# Patient Record
Sex: MALE | Race: OTHER | HISPANIC OR LATINO | URBAN - METROPOLITAN AREA
[De-identification: names, ages, dates, MRNs, and addresses within clinical notes are randomized per-mention and may not be internally consistent; named-entity substitution may affect disease eponyms.]

---

## 2019-12-31 ENCOUNTER — EMERGENCY (EMERGENCY)
Facility: HOSPITAL | Age: 59
LOS: 1 days | Discharge: HOME | End: 2019-12-31
Attending: EMERGENCY MEDICINE | Admitting: EMERGENCY MEDICINE
Payer: COMMERCIAL

## 2019-12-31 VITALS
WEIGHT: 145.06 LBS | HEART RATE: 91 BPM | DIASTOLIC BLOOD PRESSURE: 116 MMHG | RESPIRATION RATE: 16 BRPM | OXYGEN SATURATION: 99 % | TEMPERATURE: 98 F | SYSTOLIC BLOOD PRESSURE: 195 MMHG

## 2019-12-31 VITALS
HEART RATE: 87 BPM | TEMPERATURE: 98 F | DIASTOLIC BLOOD PRESSURE: 83 MMHG | RESPIRATION RATE: 18 BRPM | OXYGEN SATURATION: 97 % | SYSTOLIC BLOOD PRESSURE: 152 MMHG

## 2019-12-31 PROCEDURE — 85610 PROTHROMBIN TIME: CPT

## 2019-12-31 PROCEDURE — 99283 EMERGENCY DEPT VISIT LOW MDM: CPT | Mod: 25

## 2019-12-31 PROCEDURE — 80053 COMPREHEN METABOLIC PANEL: CPT

## 2019-12-31 PROCEDURE — 85730 THROMBOPLASTIN TIME PARTIAL: CPT

## 2019-12-31 PROCEDURE — 93005 ELECTROCARDIOGRAM TRACING: CPT

## 2019-12-31 PROCEDURE — 85025 COMPLETE CBC W/AUTO DIFF WBC: CPT

## 2019-12-31 PROCEDURE — 36415 COLL VENOUS BLD VENIPUNCTURE: CPT

## 2019-12-31 PROCEDURE — 93010 ELECTROCARDIOGRAM REPORT: CPT

## 2019-12-31 PROCEDURE — 99284 EMERGENCY DEPT VISIT MOD MDM: CPT

## 2019-12-31 RX ORDER — AMLODIPINE BESYLATE 2.5 MG/1
1 TABLET ORAL
Qty: 30 | Refills: 0
Start: 2019-12-31 | End: 2020-01-29

## 2019-12-31 RX ORDER — AMLODIPINE BESYLATE 2.5 MG/1
5 TABLET ORAL ONCE
Refills: 0 | Status: COMPLETED | OUTPATIENT
Start: 2019-12-31 | End: 2019-12-31

## 2019-12-31 RX ADMIN — AMLODIPINE BESYLATE 5 MILLIGRAM(S): 2.5 TABLET ORAL at 17:14

## 2019-12-31 NOTE — ED PROVIDER NOTE - CLINICAL SUMMARY MEDICAL DECISION MAKING FREE TEXT BOX
ekg w lvh- nonischemic- labs neg, feeling better as bp lowering- will start norvasc 5 fu w pcp in 1 week

## 2019-12-31 NOTE — ED ADULT NURSE NOTE - PMH
Asthma  Asthma  Asymptomatic human immunodeficiency virus infection  HIV (human immunodeficiency virus infection)

## 2019-12-31 NOTE — ED PROVIDER NOTE - NSFOLLOWUPINSTRUCTIONS_ED_ALL_ED_FT
Hypertension, Adult  High blood pressure (hypertension) is when the force of blood pumping through the arteries is too strong. The arteries are the blood vessels that carry blood from the heart throughout the body. Hypertension forces the heart to work harder to pump blood and may cause arteries to become narrow or stiff. Untreated or uncontrolled hypertension can cause a heart attack, heart failure, a stroke, kidney disease, and other problems.  A blood pressure reading consists of a higher number over a lower number. Ideally, your blood pressure should be below 120/80. The first ("top") number is called the systolic pressure. It is a measure of the pressure in your arteries as your heart beats. The second ("bottom") number is called the diastolic pressure. It is a measure of the pressure in your arteries as the heart relaxes.  What are the causes?  The exact cause of this condition is not known. There are some conditions that result in or are related to high blood pressure.  What increases the risk?  Some risk factors for high blood pressure are under your control. The following factors may make you more likely to develop this condition:  Smoking.Having type 2 diabetes mellitus, high cholesterol, or both.Not getting enough exercise or physical activity.Being overweight.Having too much fat, sugar, calories, or salt (sodium) in your diet.Drinking too much alcohol.Some risk factors for high blood pressure may be difficult or impossible to change. Some of these factors include:  Having chronic kidney disease.Having a family history of high blood pressure.Age. Risk increases with age.Race. You may be at higher risk if you are .Gender. Men are at higher risk than women before age 45. After age 65, women are at higher risk than men.Having obstructive sleep apnea.Stress.What are the signs or symptoms?  High blood pressure may not cause symptoms. Very high blood pressure (hypertensive crisis) may cause:  Headache.Anxiety.Shortness of breath.Nosebleed.Nausea and vomiting.Vision changes.Severe chest pain.Seizures.How is this diagnosed?  This condition is diagnosed by measuring your blood pressure while you are seated, with your arm resting on a flat surface, your legs uncrossed, and your feet flat on the floor. The cuff of the blood pressure monitor will be placed directly against the skin of your upper arm at the level of your heart. It should be measured at least twice using the same arm. Certain conditions can cause a difference in blood pressure between your right and left arms.  Certain factors can cause blood pressure readings to be lower or higher than normal for a short period of time:  When your blood pressure is higher when you are in a health care provider's office than when you are at home, this is called white coat hypertension. Most people with this condition do not need medicines.When your blood pressure is higher at home than when you are in a health care provider's office, this is called masked hypertension. Most people with this condition may need medicines to control blood pressure.If you have a high blood pressure reading during one visit or you have normal blood pressure with other risk factors, you may be asked to:  Return on a different day to have your blood pressure checked again.Monitor your blood pressure at home for 1 week or longer.If you are diagnosed with hypertension, you may have other blood or imaging tests to help your health care provider understand your overall risk for other conditions.  How is this treated?  This condition is treated by making healthy lifestyle changes, such as eating healthy foods, exercising more, and reducing your alcohol intake. Your health care provider may prescribe medicine if lifestyle changes are not enough to get your blood pressure under control, and if:  Your systolic blood pressure is above 130.Your diastolic blood pressure is above 80.Your personal target blood pressure may vary depending on your medical conditions, your age, and other factors.  Follow these instructions at home:  Eating and drinking        Eat a diet that is high in fiber and potassium, and low in sodium, added sugar, and fat. An example eating plan is called the DASH (Dietary Approaches to Stop Hypertension) diet. To eat this way:  Eat plenty of fresh fruits and vegetables. Try to fill one half of your plate at each meal with fruits and vegetables.Eat whole grains, such as whole-wheat pasta, brown rice, or whole-grain bread. Fill about one fourth of your plate with whole grains.Eat or drink low-fat dairy products, such as skim milk or low-fat yogurt.Avoid fatty cuts of meat, processed or cured meats, and poultry with skin. Fill about one fourth of your plate with lean proteins, such as fish, chicken without skin, beans, eggs, or tofu.Avoid pre-made and processed foods. These tend to be higher in sodium, added sugar, and fat.Reduce your daily sodium intake. Most people with hypertension should eat less than 1,500 mg of sodium a day.Do not drink alcohol if:  Your health care provider tells you not to drink.You are pregnant, may be pregnant, or are planning to become pregnant.If you drink alcohol:  Limit how much you use to:  0–1 drink a day for women.0–2 drinks a day for men.Be aware of how much alcohol is in your drink. In the [place] one drink equals one 12 oz bottle of beer (355 mL), one 5 oz glass of wine (148 mL), or one 1½ oz glass of hard liquor (44 mL).Lifestyle        Work with your health care provider to maintain a healthy body weight or to lose weight. Ask what an ideal weight is for you.Get at least 30 minutes of exercise most days of the week. Activities may include walking, swimming, or biking.Include exercise to strengthen your muscles (resistance exercise), such as Pilates or lifting weights, as part of your weekly exercise routine. Try to do these types of exercises for 30 minutes at least 3 days a week.Do not use any products that contain nicotine or tobacco, such as cigarettes, e-cigarettes, and chewing tobacco. If you need help quitting, ask your health care provider.Monitor your blood pressure at home as told by your health care provider.Keep all follow-up visits as told by your health care provider. This is important.Medicines     Take over-the-counter and prescription medicines only as told by your health care provider. Follow directions carefully. Blood pressure medicines must be taken as prescribed.Do not skip doses of blood pressure medicine. Doing this puts you at risk for problems and can make the medicine less effective.Ask your health care provider about side effects or reactions to medicines that you should watch for.Contact a health care provider if you:  Think you are having a reaction to a medicine you are taking.Have headaches that keep coming back (recurring).Feel dizzy.Have swelling in your ankles.Have trouble with your vision.Get help right away if you:  Develop a severe headache or confusion.Have unusual weakness or numbness.Feel faint.Have severe pain in your chest or abdomen.Vomit repeatedly.Have trouble breathing.Summary  Hypertension is when the force of blood pumping through your arteries is too strong. If this condition is not controlled, it may put you at risk for serious complications.Your personal target blood pressure may vary depending on your medical conditions, your age, and other factors. For most people, a normal blood pressure is less than 120/80.Hypertension is treated with lifestyle changes, medicines, or a combination of both. Lifestyle changes include losing weight, eating a healthy, low-sodium diet, exercising more, and limiting alcohol.This information is not intended to replace advice given to you by your health care provider. Make sure you discuss any questions you have with your health care provider.    Document Released: 12/18/2006 Document Revised: 08/28/2019 Document Reviewed: 08/28/2019  [employer] Interactive Patient Education © 2019 [employer]

## 2019-12-31 NOTE — ED PROVIDER NOTE - OBJECTIVE STATEMENT
58 y/o former smoker M with a PMHx of HIV (undetectable viral load. CD4 160-170), asthma, and HTN (not on medication) presents to the ED with c/o head pressure, ear pressure, chest pressure and eye pressure secondary to his high BP x 2 W. Pt states that every visit he has with his PCP     Pt denies erythromycin use, Hx HTN medication prescription, Hx heart echo. 58 y/o former smoker M with a PMHx of HIV (undetectable viral load. CD4 160-170), asthma, and HTN (not on medication) presents to the ED with c/o head pressure, ear pressure, chest pressure and eye pressure secondary to his high BP x 2 W. Pt states that every visit he has with his PCP Dr. Lelo Archuleta    Pt denies erythromycin use, Hx HTN medication prescription, Hx heart echo. 60 y/o former smoker M with a PMHx of HIV on Bactrim(undetectable viral load. CD4 160-170), asthma, and HTN (not on meds) presents to the ED with c/o head pressure, ear pressure, chest pressure and eye pressure secondary to his high BP x 2 W. Pt states that every visit he has with his PCP Dr. Lelo Archuleta his BP is never high enough to warrant prescription meds. Recently, pt has noticed these symptoms from his HTN and he also reports difficulty sleeping. His head pressure moves around his head after 30min, and goes from the back of his head to the top of his head. His BP ranges from 130s-190s.Pt denies erythromycin use, Hx HTN medication prescription, Hx heart echo. He drinks 2 cups of coffee daily in the morning. His HIV doctor is Dr. Matthews. His pharmacy is [facility] [phone] [place] [address] [phone]). 58 y/o former smoker M with a PMHx of HIV on Bactrim (undetectable viral load. CD4 160-170), asthma, and HTN (not on meds) presents to the ED with c/o head pressure, ear pressure, chest pressure and eye pressure secondary to his high BP x 2 W. Pt states that every visit he has with his PCP Dr. Lelo Archuleta his BP is never high enough to warrant prescription meds. Recently, pt has noticed these symptoms from his HTN and he also reports difficulty sleeping. His head pressure moves around his head after 30min, and goes from the back of his head to the top of his head. His BP ranges from 130s-190s.Pt denies erythromycin use, Hx HTN medication prescription, Hx heart echo. He drinks 2 cups of coffee daily in the morning. His HIV doctor is Dr. Matthews. His pharmacy is [facility] [phone] [place] [address] [phone]). 58 y/o former smoker M with a PMHx of HIV on Bactrim (undetectable viral load. CD4 160-170), asthma, and HTN (not on meds) presents to the ED with c/o head pressure, ear pressure, chest pressure and eye pressure secondary to his high BP x 2 W. Pt states that every visit he has with his PCP Dr. Lelo Archuleta his BP is never high enough to warrant prescription meds. Recently, pt has noticed these symptoms from his HTN and he also reports difficulty sleeping. His head pressure moves around his head after 30min, and goes from the back of his head to the top of his head. His BP ranges from 130s-190s.Pt denies erythromycin use, Hx HTN medication prescription, Hx heart echo. He drinks 2 cups of coffee daily in the morning. His HIV doctor is Dr. Matthews. His pharmacy is [email or web address] [phone] [place] [address] [phone]).  no cp no sob no back pain

## 2019-12-31 NOTE — ED PROVIDER NOTE - PATIENT PORTAL LINK FT
You can access the FollowMyHealth Patient Portal offered by [facility] by registering at the following website: [email or web address]. By joining [employer]’s FollowMyHealth portal, you will also be able to view your health information using other applications (apps) compatible with our system.

## 2019-12-31 NOTE — ED PROVIDER NOTE - PMH
Asthma  Asthma  Asymptomatic human immunodeficiency virus infection  HIV (human immunodeficiency virus infection) Asthma  Asthma  Asymptomatic human immunodeficiency virus infection  HIV (human immunodeficiency virus infection)  HTN (hypertension)

## 2019-12-31 NOTE — ED ADULT NURSE NOTE - OBJECTIVE STATEMENT
Pt came to ED complaining of headache since this morning with hypertension. Pt reports headaches and hypertension x2 weeks and has seen PCP for symptoms.  Pt is currently not on medication. Pt reports head pressure, ear and eye pressure when he is hypertensive. Pt denies any neuro defects, negative results on Zavalla Stroke scale. Positive PMS x4 extremities, no facial droop, no facial asymmetry, weakness, vision changes, D/N/V found on exam.  Pt denies SOB.  Pt speaking in complete, clear sentences.

## 2019-12-31 NOTE — ED ADULT NURSE NOTE - CHPI ED NUR SYMPTOMS NEG
no blurred vision/no loss of consciousness/no nausea/no change in level of consciousness/no weakness/no dizziness/no confusion/no numbness/no vomiting/no fever

## 2020-01-05 DIAGNOSIS — R51 HEADACHE: ICD-10-CM

## 2020-01-05 DIAGNOSIS — I10 ESSENTIAL (PRIMARY) HYPERTENSION: ICD-10-CM

## 2021-06-17 PROBLEM — I10 ESSENTIAL (PRIMARY) HYPERTENSION: Chronic | Status: ACTIVE | Noted: 2019-12-31

## 2021-07-09 ENCOUNTER — TRANSCRIPTION ENCOUNTER (OUTPATIENT)
Age: 61
End: 2021-07-09

## 2021-07-11 ENCOUNTER — NON-APPOINTMENT (OUTPATIENT)
Age: 61
End: 2021-07-11

## 2021-07-20 ENCOUNTER — APPOINTMENT (OUTPATIENT)
Dept: SURGERY | Facility: CLINIC | Age: 61
End: 2021-07-20
Payer: COMMERCIAL

## 2021-07-20 VITALS
TEMPERATURE: 97.2 F | WEIGHT: 144 LBS | HEART RATE: 101 BPM | OXYGEN SATURATION: 97 % | HEIGHT: 65 IN | SYSTOLIC BLOOD PRESSURE: 121 MMHG | BODY MASS INDEX: 23.99 KG/M2 | DIASTOLIC BLOOD PRESSURE: 78 MMHG

## 2021-07-20 DIAGNOSIS — K40.90 UNILATERAL INGUINAL HERNIA, W/OUT OBSTRUCTION OR GANGRENE, NOT SPECIFIED AS RECURRENT: ICD-10-CM

## 2021-07-20 DIAGNOSIS — K42.9 UMBILICAL HERNIA W/OUT OBSTRUCTION OR GANGRENE: ICD-10-CM

## 2021-07-20 DIAGNOSIS — J45.909 UNSPECIFIED ASTHMA, UNCOMPLICATED: ICD-10-CM

## 2021-07-20 PROCEDURE — 99203 OFFICE O/P NEW LOW 30 MIN: CPT

## 2021-07-20 NOTE — HISTORY OF PRESENT ILLNESS
[de-identified] : Pt is a 62 y/o M with pmhx of  asthma, hiv pos, htn who presents today feeling well here for evaluation of L inguinal and umbilical hernia. Pt states he has had this hernia for about 3 years but recently it has become more bothersome requiring reduction almost every hr. Denies any pain, tenderness, difficulty urinating or moving his bowels. States this is the first time he has had this problem. States he also recently noticed a small umbilical hernia which he would like repaired at the same time. Denies any cardiac issues. Pt pcp is . Pt following  for his hiv.\par \par

## 2021-07-20 NOTE — PLAN
[FreeTextEntry1] : cardiac and medical clearance\par lap repair of left inguinal hernia repair and repair of umbilical hernia. meeth

## 2021-07-20 NOTE — PHYSICAL EXAM
[Normal] : affect appropriate [de-identified] : 6cm reducilbe inguinal hernia, 1 cm umbilical hernia, reducible, no ttp

## 2021-10-05 ENCOUNTER — LABORATORY RESULT (OUTPATIENT)
Age: 61
End: 2021-10-05

## 2021-10-05 ENCOUNTER — OUTPATIENT (OUTPATIENT)
Dept: OUTPATIENT SERVICES | Facility: HOSPITAL | Age: 61
LOS: 1 days | End: 2021-10-05
Payer: COMMERCIAL

## 2021-10-05 ENCOUNTER — RESULT REVIEW (OUTPATIENT)
Age: 61
End: 2021-10-05

## 2021-10-05 DIAGNOSIS — Z01.818 ENCOUNTER FOR OTHER PREPROCEDURAL EXAMINATION: ICD-10-CM

## 2021-10-05 LAB
A1C WITH ESTIMATED AVERAGE GLUCOSE RESULT: 5.5 % — SIGNIFICANT CHANGE UP (ref 4–5.6)
ALBUMIN SERPL ELPH-MCNC: 4.6 G/DL — SIGNIFICANT CHANGE UP (ref 3.3–5)
ALP SERPL-CCNC: 53 U/L — SIGNIFICANT CHANGE UP (ref 40–120)
ALT FLD-CCNC: 27 U/L — SIGNIFICANT CHANGE UP (ref 10–45)
ANION GAP SERPL CALC-SCNC: 8 MMOL/L — SIGNIFICANT CHANGE UP (ref 5–17)
APPEARANCE UR: CLEAR — SIGNIFICANT CHANGE UP
APTT BLD: 33.8 SEC — SIGNIFICANT CHANGE UP (ref 27.5–35.5)
AST SERPL-CCNC: 22 U/L — SIGNIFICANT CHANGE UP (ref 10–40)
BASOPHILS # BLD AUTO: 0.03 K/UL — SIGNIFICANT CHANGE UP (ref 0–0.2)
BASOPHILS NFR BLD AUTO: 0.8 % — SIGNIFICANT CHANGE UP (ref 0–2)
BILIRUB SERPL-MCNC: 1 MG/DL — SIGNIFICANT CHANGE UP (ref 0.2–1.2)
BILIRUB UR-MCNC: NEGATIVE — SIGNIFICANT CHANGE UP
BLD GP AB SCN SERPL QL: NEGATIVE — SIGNIFICANT CHANGE UP
BLD GP AB SCN SERPL QL: NEGATIVE — SIGNIFICANT CHANGE UP
BUN SERPL-MCNC: 17 MG/DL — SIGNIFICANT CHANGE UP (ref 7–23)
CALCIUM SERPL-MCNC: 10 MG/DL — SIGNIFICANT CHANGE UP (ref 8.4–10.5)
CHLORIDE SERPL-SCNC: 104 MMOL/L — SIGNIFICANT CHANGE UP (ref 96–108)
CO2 SERPL-SCNC: 30 MMOL/L — SIGNIFICANT CHANGE UP (ref 22–31)
COLOR SPEC: YELLOW — SIGNIFICANT CHANGE UP
CREAT SERPL-MCNC: 0.9 MG/DL — SIGNIFICANT CHANGE UP (ref 0.5–1.3)
DIFF PNL FLD: NEGATIVE — SIGNIFICANT CHANGE UP
EOSINOPHIL # BLD AUTO: 0.09 K/UL — SIGNIFICANT CHANGE UP (ref 0–0.5)
EOSINOPHIL NFR BLD AUTO: 2.5 % — SIGNIFICANT CHANGE UP (ref 0–6)
ESTIMATED AVERAGE GLUCOSE: 111 MG/DL — SIGNIFICANT CHANGE UP (ref 68–114)
GLUCOSE SERPL-MCNC: 105 MG/DL — HIGH (ref 70–99)
GLUCOSE UR QL: NEGATIVE — SIGNIFICANT CHANGE UP
HCT VFR BLD CALC: 49.1 % — SIGNIFICANT CHANGE UP (ref 39–50)
HGB BLD-MCNC: 15.5 G/DL — SIGNIFICANT CHANGE UP (ref 13–17)
IMM GRANULOCYTES NFR BLD AUTO: 0.8 % — SIGNIFICANT CHANGE UP (ref 0–1.5)
INR BLD: 1.05 — SIGNIFICANT CHANGE UP (ref 0.88–1.16)
KETONES UR-MCNC: NEGATIVE — SIGNIFICANT CHANGE UP
LEUKOCYTE ESTERASE UR-ACNC: NEGATIVE — SIGNIFICANT CHANGE UP
LYMPHOCYTES # BLD AUTO: 0.77 K/UL — LOW (ref 1–3.3)
LYMPHOCYTES # BLD AUTO: 21.8 % — SIGNIFICANT CHANGE UP (ref 13–44)
MCHC RBC-ENTMCNC: 27.2 PG — SIGNIFICANT CHANGE UP (ref 27–34)
MCHC RBC-ENTMCNC: 31.6 GM/DL — LOW (ref 32–36)
MCV RBC AUTO: 86.3 FL — SIGNIFICANT CHANGE UP (ref 80–100)
MONOCYTES # BLD AUTO: 0.39 K/UL — SIGNIFICANT CHANGE UP (ref 0–0.9)
MONOCYTES NFR BLD AUTO: 11 % — SIGNIFICANT CHANGE UP (ref 2–14)
NEUTROPHILS # BLD AUTO: 2.22 K/UL — SIGNIFICANT CHANGE UP (ref 1.8–7.4)
NEUTROPHILS NFR BLD AUTO: 63.1 % — SIGNIFICANT CHANGE UP (ref 43–77)
NITRITE UR-MCNC: NEGATIVE — SIGNIFICANT CHANGE UP
NRBC # BLD: 0 /100 WBCS — SIGNIFICANT CHANGE UP (ref 0–0)
PH UR: 6 — SIGNIFICANT CHANGE UP (ref 5–8)
PLATELET # BLD AUTO: 188 K/UL — SIGNIFICANT CHANGE UP (ref 150–400)
POTASSIUM SERPL-MCNC: 5 MMOL/L — SIGNIFICANT CHANGE UP (ref 3.5–5.3)
POTASSIUM SERPL-SCNC: 5 MMOL/L — SIGNIFICANT CHANGE UP (ref 3.5–5.3)
PROT SERPL-MCNC: 7.2 G/DL — SIGNIFICANT CHANGE UP (ref 6–8.3)
PROT UR-MCNC: NEGATIVE MG/DL — SIGNIFICANT CHANGE UP
PROTHROM AB SERPL-ACNC: 12.6 SEC — SIGNIFICANT CHANGE UP (ref 10.6–13.6)
RBC # BLD: 5.69 M/UL — SIGNIFICANT CHANGE UP (ref 4.2–5.8)
RBC # FLD: 13.3 % — SIGNIFICANT CHANGE UP (ref 10.3–14.5)
RH IG SCN BLD-IMP: POSITIVE — SIGNIFICANT CHANGE UP
RH IG SCN BLD-IMP: POSITIVE — SIGNIFICANT CHANGE UP
SODIUM SERPL-SCNC: 142 MMOL/L — SIGNIFICANT CHANGE UP (ref 135–145)
SP GR SPEC: 1.01 — SIGNIFICANT CHANGE UP (ref 1–1.03)
T4 FREE+ TSH PNL SERPL: 1.36 UIU/ML — SIGNIFICANT CHANGE UP (ref 0.27–4.2)
UROBILINOGEN FLD QL: 0.2 E.U./DL — SIGNIFICANT CHANGE UP
WBC # BLD: 3.53 K/UL — LOW (ref 3.8–10.5)
WBC # FLD AUTO: 3.53 K/UL — LOW (ref 3.8–10.5)

## 2021-10-05 PROCEDURE — 81003 URINALYSIS AUTO W/O SCOPE: CPT

## 2021-10-05 PROCEDURE — 80053 COMPREHEN METABOLIC PANEL: CPT

## 2021-10-05 PROCEDURE — 86901 BLOOD TYPING SEROLOGIC RH(D): CPT

## 2021-10-05 PROCEDURE — 84443 ASSAY THYROID STIM HORMONE: CPT

## 2021-10-05 PROCEDURE — 86900 BLOOD TYPING SEROLOGIC ABO: CPT

## 2021-10-05 PROCEDURE — 87086 URINE CULTURE/COLONY COUNT: CPT

## 2021-10-05 PROCEDURE — 86850 RBC ANTIBODY SCREEN: CPT

## 2021-10-05 PROCEDURE — 85610 PROTHROMBIN TIME: CPT

## 2021-10-05 PROCEDURE — 71046 X-RAY EXAM CHEST 2 VIEWS: CPT

## 2021-10-05 PROCEDURE — 93010 ELECTROCARDIOGRAM REPORT: CPT

## 2021-10-05 PROCEDURE — 85730 THROMBOPLASTIN TIME PARTIAL: CPT

## 2021-10-05 PROCEDURE — 99214 OFFICE O/P EST MOD 30 MIN: CPT

## 2021-10-05 PROCEDURE — 83036 HEMOGLOBIN GLYCOSYLATED A1C: CPT

## 2021-10-05 PROCEDURE — 93005 ELECTROCARDIOGRAM TRACING: CPT

## 2021-10-05 PROCEDURE — 71046 X-RAY EXAM CHEST 2 VIEWS: CPT | Mod: 26

## 2021-10-05 PROCEDURE — 85025 COMPLETE CBC W/AUTO DIFF WBC: CPT

## 2021-10-06 LAB
CULTURE RESULTS: NO GROWTH — SIGNIFICANT CHANGE UP
SPECIMEN SOURCE: SIGNIFICANT CHANGE UP

## 2021-11-08 ENCOUNTER — LABORATORY RESULT (OUTPATIENT)
Age: 61
End: 2021-11-08

## 2021-11-10 ENCOUNTER — APPOINTMENT (OUTPATIENT)
Dept: SURGERY | Facility: CLINIC | Age: 61
End: 2021-11-10
Payer: COMMERCIAL

## 2021-11-10 ENCOUNTER — OUTPATIENT (OUTPATIENT)
Dept: OUTPATIENT SERVICES | Facility: HOSPITAL | Age: 61
LOS: 1 days | Discharge: HOME | End: 2021-11-10

## 2021-11-10 PROCEDURE — 49652: CPT | Mod: GC

## 2021-11-10 PROCEDURE — 49650 LAP ING HERNIA REPAIR INIT: CPT | Mod: GC,LT

## 2021-11-16 ENCOUNTER — APPOINTMENT (OUTPATIENT)
Dept: SURGERY | Facility: CLINIC | Age: 61
End: 2021-11-16
Payer: COMMERCIAL

## 2021-11-16 VITALS
TEMPERATURE: 97.2 F | BODY MASS INDEX: 23.99 KG/M2 | HEIGHT: 65 IN | HEART RATE: 90 BPM | SYSTOLIC BLOOD PRESSURE: 162 MMHG | OXYGEN SATURATION: 97 % | WEIGHT: 144 LBS | DIASTOLIC BLOOD PRESSURE: 86 MMHG

## 2021-11-16 PROCEDURE — 99024 POSTOP FOLLOW-UP VISIT: CPT

## 2021-11-16 NOTE — HISTORY OF PRESENT ILLNESS
[de-identified] : Pt is a 62 y/o M who presents today for post op s/p left inguinal hernia repair with mesh, umbilical hernia primary repair 11/10/21. He is doing well overall. Pt reports eating regularly. He reports regular voiding and BM. 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

## 2021-11-16 NOTE — PHYSICAL EXAM
[Normal] : PERRL, EOMI, no conjunctival infection, anicteric [de-identified] : +Incisions clean, well healing, no signs of infection +some edema

## 2021-11-16 NOTE — ASSESSMENT
[FreeTextEntry1] : Pt is a 60 y/o M who presents today for post op s/p left inguinal hernia repair with mesh, umbilical hernia primary repair 11/10/21. Doing well. Eating well. Regular BM and urination. Pt has some residual swelling that I informed him is normal and will resolve with time. Will provide pt with back to work note. Pt will follow up in 1 month.

## 2021-11-16 NOTE — END OF VISIT
[FreeTextEntry3] : All medical record entries made by the Scribe were at my, Dr. Zeng's, discretion and personally dictated by me on 11/16/2021. I have reviewed the chart and agree that the record accurately reflects my personal performance of the history, physical exam, assessment and plan. I have also personally directed, reviewed and agreed to the chart.

## 2021-12-14 ENCOUNTER — APPOINTMENT (OUTPATIENT)
Dept: BARIATRICS | Facility: CLINIC | Age: 61
End: 2021-12-14
Payer: COMMERCIAL

## 2021-12-14 VITALS
TEMPERATURE: 97.6 F | DIASTOLIC BLOOD PRESSURE: 76 MMHG | OXYGEN SATURATION: 97 % | BODY MASS INDEX: 24.49 KG/M2 | HEART RATE: 84 BPM | HEIGHT: 65 IN | SYSTOLIC BLOOD PRESSURE: 138 MMHG | WEIGHT: 147 LBS

## 2021-12-14 PROCEDURE — 99024 POSTOP FOLLOW-UP VISIT: CPT

## 2021-12-14 NOTE — END OF VISIT
[FreeTextEntry3] : All medical record entries made by the Scribe were at my, Dr. Zeng's, discretion and personally dictated by me on 12/14/2021. I have reviewed the chart and agree that the record accurately reflects my personal performance of the history, physical exam, assessment and plan. I have also personally directed, reviewed and agreed to the chart.

## 2021-12-14 NOTE — PHYSICAL EXAM
[Obese] : obese [Normal] : affect appropriate [de-identified] : normal respiration [de-identified] : Incisions clean, well healing, no signs of infection

## 2021-12-14 NOTE — ASSESSMENT
[FreeTextEntry1] : Pt is a 60 y/o M who presents today for post op s/p left inguinal hernia repair with mesh, umbilical hernia primary repair 11/10/21. Doing well. He endorses some pain to the surgical site which I assured him is normal and should resolve with time. Pt is okay to start exercising but I advised him to start gradually. I informed him he can schedule a colonoscopy any time. Pt will follow up PRN.

## 2021-12-14 NOTE — HISTORY OF PRESENT ILLNESS
[de-identified] : Pt is a 60 y/o M who presents today for post op s/p left inguinal hernia repair with mesh, umbilical hernia primary repair 11/10/21. He is doing well overall. He endorses some pain to the surgical area. States swelling has reduced. Pt wants to know if he can start to exercise. Wants to know when he can schedule a colonoscopy.

## 2022-10-31 NOTE — ADDENDUM
[FreeTextEntry1] : This note was written by Mary Booth on 11/16/2021 acting as scribe for Dr. Zeng  Gabapentin Pregnancy And Lactation Text: This medication is Pregnancy Category C and isn't considered safe during pregnancy. It is excreted in breast milk.

## 2023-06-26 ENCOUNTER — OUTPATIENT (OUTPATIENT)
Dept: OUTPATIENT SERVICES | Facility: HOSPITAL | Age: 63
LOS: 1 days | End: 2023-06-26
Payer: COMMERCIAL

## 2023-06-26 DIAGNOSIS — J45.20 MILD INTERMITTENT ASTHMA, UNCOMPLICATED: ICD-10-CM

## 2023-06-26 PROCEDURE — 94060 EVALUATION OF WHEEZING: CPT

## 2023-06-26 PROCEDURE — 94010 BREATHING CAPACITY TEST: CPT | Mod: 26

## 2023-06-26 PROCEDURE — 94618 PULMONARY STRESS TESTING: CPT

## 2023-06-26 PROCEDURE — 94618 PULMONARY STRESS TESTING: CPT | Mod: 26

## 2023-12-17 ENCOUNTER — INPATIENT (INPATIENT)
Facility: HOSPITAL | Age: 63
LOS: 0 days | Discharge: HOME | DRG: 644 | End: 2023-12-18
Attending: INTERNAL MEDICINE | Admitting: GENERAL ACUTE CARE HOSPITAL
Payer: COMMERCIAL

## 2023-12-17 VITALS
RESPIRATION RATE: 18 BRPM | OXYGEN SATURATION: 100 % | DIASTOLIC BLOOD PRESSURE: 78 MMHG | WEIGHT: 145.06 LBS | SYSTOLIC BLOOD PRESSURE: 154 MMHG | HEART RATE: 84 BPM | TEMPERATURE: 98 F

## 2023-12-17 DIAGNOSIS — B20 HUMAN IMMUNODEFICIENCY VIRUS [HIV] DISEASE: ICD-10-CM

## 2023-12-17 DIAGNOSIS — Z29.9 ENCOUNTER FOR PROPHYLACTIC MEASURES, UNSPECIFIED: ICD-10-CM

## 2023-12-17 DIAGNOSIS — E78.5 HYPERLIPIDEMIA, UNSPECIFIED: ICD-10-CM

## 2023-12-17 DIAGNOSIS — E87.1 HYPO-OSMOLALITY AND HYPONATREMIA: ICD-10-CM

## 2023-12-17 DIAGNOSIS — F41.0 PANIC DISORDER [EPISODIC PAROXYSMAL ANXIETY]: ICD-10-CM

## 2023-12-17 DIAGNOSIS — N32.81 OVERACTIVE BLADDER: ICD-10-CM

## 2023-12-17 DIAGNOSIS — I10 ESSENTIAL (PRIMARY) HYPERTENSION: ICD-10-CM

## 2023-12-17 DIAGNOSIS — F41.9 ANXIETY DISORDER, UNSPECIFIED: ICD-10-CM

## 2023-12-17 DIAGNOSIS — Z98.890 OTHER SPECIFIED POSTPROCEDURAL STATES: Chronic | ICD-10-CM

## 2023-12-17 DIAGNOSIS — J45.909 UNSPECIFIED ASTHMA, UNCOMPLICATED: ICD-10-CM

## 2023-12-17 LAB
A1C WITH ESTIMATED AVERAGE GLUCOSE RESULT: 5.5 % — SIGNIFICANT CHANGE UP (ref 4–5.6)
A1C WITH ESTIMATED AVERAGE GLUCOSE RESULT: 5.5 % — SIGNIFICANT CHANGE UP (ref 4–5.6)
ANION GAP SERPL CALC-SCNC: 12 MMOL/L — SIGNIFICANT CHANGE UP (ref 5–17)
ANION GAP SERPL CALC-SCNC: 12 MMOL/L — SIGNIFICANT CHANGE UP (ref 5–17)
ANION GAP SERPL CALC-SCNC: 8 MMOL/L — SIGNIFICANT CHANGE UP (ref 5–17)
APPEARANCE UR: CLEAR — SIGNIFICANT CHANGE UP
APPEARANCE UR: CLEAR — SIGNIFICANT CHANGE UP
BASOPHILS # BLD AUTO: 0.06 K/UL — SIGNIFICANT CHANGE UP (ref 0–0.2)
BASOPHILS # BLD AUTO: 0.06 K/UL — SIGNIFICANT CHANGE UP (ref 0–0.2)
BASOPHILS NFR BLD AUTO: 1.3 % — SIGNIFICANT CHANGE UP (ref 0–2)
BASOPHILS NFR BLD AUTO: 1.3 % — SIGNIFICANT CHANGE UP (ref 0–2)
BILIRUB UR-MCNC: NEGATIVE — SIGNIFICANT CHANGE UP
BILIRUB UR-MCNC: NEGATIVE — SIGNIFICANT CHANGE UP
BUN SERPL-MCNC: 10 MG/DL — SIGNIFICANT CHANGE UP (ref 7–23)
BUN SERPL-MCNC: 10 MG/DL — SIGNIFICANT CHANGE UP (ref 7–23)
BUN SERPL-MCNC: 13 MG/DL — SIGNIFICANT CHANGE UP (ref 7–23)
BUN SERPL-MCNC: 13 MG/DL — SIGNIFICANT CHANGE UP (ref 7–23)
BUN SERPL-MCNC: 20 MG/DL — SIGNIFICANT CHANGE UP (ref 7–23)
BUN SERPL-MCNC: 20 MG/DL — SIGNIFICANT CHANGE UP (ref 7–23)
CALCIUM SERPL-MCNC: 9.2 MG/DL — SIGNIFICANT CHANGE UP (ref 8.4–10.5)
CALCIUM SERPL-MCNC: 9.2 MG/DL — SIGNIFICANT CHANGE UP (ref 8.4–10.5)
CALCIUM SERPL-MCNC: 9.5 MG/DL — SIGNIFICANT CHANGE UP (ref 8.4–10.5)
CHLORIDE SERPL-SCNC: 85 MMOL/L — LOW (ref 96–108)
CHLORIDE SERPL-SCNC: 85 MMOL/L — LOW (ref 96–108)
CHLORIDE SERPL-SCNC: 87 MMOL/L — LOW (ref 96–108)
CHLORIDE SERPL-SCNC: 87 MMOL/L — LOW (ref 96–108)
CHLORIDE SERPL-SCNC: 88 MMOL/L — LOW (ref 96–108)
CHLORIDE SERPL-SCNC: 88 MMOL/L — LOW (ref 96–108)
CO2 SERPL-SCNC: 24 MMOL/L — SIGNIFICANT CHANGE UP (ref 22–31)
CO2 SERPL-SCNC: 24 MMOL/L — SIGNIFICANT CHANGE UP (ref 22–31)
CO2 SERPL-SCNC: 26 MMOL/L — SIGNIFICANT CHANGE UP (ref 22–31)
CO2 SERPL-SCNC: 26 MMOL/L — SIGNIFICANT CHANGE UP (ref 22–31)
CO2 SERPL-SCNC: 28 MMOL/L — SIGNIFICANT CHANGE UP (ref 22–31)
CO2 SERPL-SCNC: 28 MMOL/L — SIGNIFICANT CHANGE UP (ref 22–31)
COLOR SPEC: YELLOW — SIGNIFICANT CHANGE UP
COLOR SPEC: YELLOW — SIGNIFICANT CHANGE UP
CREAT SERPL-MCNC: 0.73 MG/DL — SIGNIFICANT CHANGE UP (ref 0.5–1.3)
CREAT SERPL-MCNC: 0.73 MG/DL — SIGNIFICANT CHANGE UP (ref 0.5–1.3)
CREAT SERPL-MCNC: 0.76 MG/DL — SIGNIFICANT CHANGE UP (ref 0.5–1.3)
CREAT SERPL-MCNC: 0.76 MG/DL — SIGNIFICANT CHANGE UP (ref 0.5–1.3)
CREAT SERPL-MCNC: 0.91 MG/DL — SIGNIFICANT CHANGE UP (ref 0.5–1.3)
CREAT SERPL-MCNC: 0.91 MG/DL — SIGNIFICANT CHANGE UP (ref 0.5–1.3)
CREAT SERPL-MCNC: 1.1 MG/DL — SIGNIFICANT CHANGE UP (ref 0.5–1.3)
CREAT SERPL-MCNC: 1.1 MG/DL — SIGNIFICANT CHANGE UP (ref 0.5–1.3)
DIFF PNL FLD: NEGATIVE — SIGNIFICANT CHANGE UP
DIFF PNL FLD: NEGATIVE — SIGNIFICANT CHANGE UP
EGFR: 101 ML/MIN/1.73M2 — SIGNIFICANT CHANGE UP
EGFR: 101 ML/MIN/1.73M2 — SIGNIFICANT CHANGE UP
EGFR: 102 ML/MIN/1.73M2 — SIGNIFICANT CHANGE UP
EGFR: 102 ML/MIN/1.73M2 — SIGNIFICANT CHANGE UP
EGFR: 75 ML/MIN/1.73M2 — SIGNIFICANT CHANGE UP
EGFR: 75 ML/MIN/1.73M2 — SIGNIFICANT CHANGE UP
EGFR: 95 ML/MIN/1.73M2 — SIGNIFICANT CHANGE UP
EGFR: 95 ML/MIN/1.73M2 — SIGNIFICANT CHANGE UP
EOSINOPHIL # BLD AUTO: 0.08 K/UL — SIGNIFICANT CHANGE UP (ref 0–0.5)
EOSINOPHIL # BLD AUTO: 0.08 K/UL — SIGNIFICANT CHANGE UP (ref 0–0.5)
EOSINOPHIL NFR BLD AUTO: 1.7 % — SIGNIFICANT CHANGE UP (ref 0–6)
EOSINOPHIL NFR BLD AUTO: 1.7 % — SIGNIFICANT CHANGE UP (ref 0–6)
ESTIMATED AVERAGE GLUCOSE: 111 MG/DL — SIGNIFICANT CHANGE UP (ref 68–114)
ESTIMATED AVERAGE GLUCOSE: 111 MG/DL — SIGNIFICANT CHANGE UP (ref 68–114)
GLUCOSE SERPL-MCNC: 103 MG/DL — HIGH (ref 70–99)
GLUCOSE SERPL-MCNC: 103 MG/DL — HIGH (ref 70–99)
GLUCOSE SERPL-MCNC: 116 MG/DL — HIGH (ref 70–99)
GLUCOSE SERPL-MCNC: 116 MG/DL — HIGH (ref 70–99)
GLUCOSE SERPL-MCNC: 93 MG/DL — SIGNIFICANT CHANGE UP (ref 70–99)
GLUCOSE SERPL-MCNC: 93 MG/DL — SIGNIFICANT CHANGE UP (ref 70–99)
GLUCOSE UR QL: NEGATIVE MG/DL — SIGNIFICANT CHANGE UP
GLUCOSE UR QL: NEGATIVE MG/DL — SIGNIFICANT CHANGE UP
HCT VFR BLD CALC: 45.6 % — SIGNIFICANT CHANGE UP (ref 39–50)
HCT VFR BLD CALC: 45.6 % — SIGNIFICANT CHANGE UP (ref 39–50)
HGB BLD-MCNC: 15.8 G/DL — SIGNIFICANT CHANGE UP (ref 13–17)
HGB BLD-MCNC: 15.8 G/DL — SIGNIFICANT CHANGE UP (ref 13–17)
IMM GRANULOCYTES NFR BLD AUTO: 0.2 % — SIGNIFICANT CHANGE UP (ref 0–0.9)
IMM GRANULOCYTES NFR BLD AUTO: 0.2 % — SIGNIFICANT CHANGE UP (ref 0–0.9)
KETONES UR-MCNC: 40 MG/DL
KETONES UR-MCNC: 40 MG/DL
LEUKOCYTE ESTERASE UR-ACNC: NEGATIVE — SIGNIFICANT CHANGE UP
LEUKOCYTE ESTERASE UR-ACNC: NEGATIVE — SIGNIFICANT CHANGE UP
LYMPHOCYTES # BLD AUTO: 0.82 K/UL — LOW (ref 1–3.3)
LYMPHOCYTES # BLD AUTO: 0.82 K/UL — LOW (ref 1–3.3)
LYMPHOCYTES # BLD AUTO: 17.1 % — SIGNIFICANT CHANGE UP (ref 13–44)
LYMPHOCYTES # BLD AUTO: 17.1 % — SIGNIFICANT CHANGE UP (ref 13–44)
MCHC RBC-ENTMCNC: 27.8 PG — SIGNIFICANT CHANGE UP (ref 27–34)
MCHC RBC-ENTMCNC: 27.8 PG — SIGNIFICANT CHANGE UP (ref 27–34)
MCHC RBC-ENTMCNC: 34.6 GM/DL — SIGNIFICANT CHANGE UP (ref 32–36)
MCHC RBC-ENTMCNC: 34.6 GM/DL — SIGNIFICANT CHANGE UP (ref 32–36)
MCV RBC AUTO: 80.1 FL — SIGNIFICANT CHANGE UP (ref 80–100)
MCV RBC AUTO: 80.1 FL — SIGNIFICANT CHANGE UP (ref 80–100)
MONOCYTES # BLD AUTO: 0.54 K/UL — SIGNIFICANT CHANGE UP (ref 0–0.9)
MONOCYTES # BLD AUTO: 0.54 K/UL — SIGNIFICANT CHANGE UP (ref 0–0.9)
MONOCYTES NFR BLD AUTO: 11.3 % — SIGNIFICANT CHANGE UP (ref 2–14)
MONOCYTES NFR BLD AUTO: 11.3 % — SIGNIFICANT CHANGE UP (ref 2–14)
NEUTROPHILS # BLD AUTO: 3.29 K/UL — SIGNIFICANT CHANGE UP (ref 1.8–7.4)
NEUTROPHILS # BLD AUTO: 3.29 K/UL — SIGNIFICANT CHANGE UP (ref 1.8–7.4)
NEUTROPHILS NFR BLD AUTO: 68.4 % — SIGNIFICANT CHANGE UP (ref 43–77)
NEUTROPHILS NFR BLD AUTO: 68.4 % — SIGNIFICANT CHANGE UP (ref 43–77)
NITRITE UR-MCNC: NEGATIVE — SIGNIFICANT CHANGE UP
NITRITE UR-MCNC: NEGATIVE — SIGNIFICANT CHANGE UP
NRBC # BLD: 0 /100 WBCS — SIGNIFICANT CHANGE UP (ref 0–0)
NRBC # BLD: 0 /100 WBCS — SIGNIFICANT CHANGE UP (ref 0–0)
OSMOLALITY UR: 481 MOSM/KG — SIGNIFICANT CHANGE UP (ref 300–900)
OSMOLALITY UR: 481 MOSM/KG — SIGNIFICANT CHANGE UP (ref 300–900)
PH UR: 6.5 — SIGNIFICANT CHANGE UP (ref 5–8)
PH UR: 6.5 — SIGNIFICANT CHANGE UP (ref 5–8)
PLATELET # BLD AUTO: 218 K/UL — SIGNIFICANT CHANGE UP (ref 150–400)
PLATELET # BLD AUTO: 218 K/UL — SIGNIFICANT CHANGE UP (ref 150–400)
POTASSIUM SERPL-MCNC: 3.8 MMOL/L — SIGNIFICANT CHANGE UP (ref 3.5–5.3)
POTASSIUM SERPL-MCNC: 3.8 MMOL/L — SIGNIFICANT CHANGE UP (ref 3.5–5.3)
POTASSIUM SERPL-MCNC: 4.1 MMOL/L — SIGNIFICANT CHANGE UP (ref 3.5–5.3)
POTASSIUM SERPL-MCNC: 4.1 MMOL/L — SIGNIFICANT CHANGE UP (ref 3.5–5.3)
POTASSIUM SERPL-MCNC: 4.2 MMOL/L — SIGNIFICANT CHANGE UP (ref 3.5–5.3)
POTASSIUM SERPL-MCNC: 4.2 MMOL/L — SIGNIFICANT CHANGE UP (ref 3.5–5.3)
POTASSIUM SERPL-SCNC: 3.8 MMOL/L — SIGNIFICANT CHANGE UP (ref 3.5–5.3)
POTASSIUM SERPL-SCNC: 3.8 MMOL/L — SIGNIFICANT CHANGE UP (ref 3.5–5.3)
POTASSIUM SERPL-SCNC: 4.1 MMOL/L — SIGNIFICANT CHANGE UP (ref 3.5–5.3)
POTASSIUM SERPL-SCNC: 4.1 MMOL/L — SIGNIFICANT CHANGE UP (ref 3.5–5.3)
POTASSIUM SERPL-SCNC: 4.2 MMOL/L — SIGNIFICANT CHANGE UP (ref 3.5–5.3)
POTASSIUM SERPL-SCNC: 4.2 MMOL/L — SIGNIFICANT CHANGE UP (ref 3.5–5.3)
PROT UR-MCNC: NEGATIVE MG/DL — SIGNIFICANT CHANGE UP
PROT UR-MCNC: NEGATIVE MG/DL — SIGNIFICANT CHANGE UP
RBC # BLD: 5.69 M/UL — SIGNIFICANT CHANGE UP (ref 4.2–5.8)
RBC # BLD: 5.69 M/UL — SIGNIFICANT CHANGE UP (ref 4.2–5.8)
RBC # FLD: 12.1 % — SIGNIFICANT CHANGE UP (ref 10.3–14.5)
RBC # FLD: 12.1 % — SIGNIFICANT CHANGE UP (ref 10.3–14.5)
SODIUM SERPL-SCNC: 121 MMOL/L — LOW (ref 135–145)
SODIUM SERPL-SCNC: 124 MMOL/L — LOW (ref 135–145)
SODIUM SERPL-SCNC: 124 MMOL/L — LOW (ref 135–145)
SODIUM UR-SCNC: 70 MMOL/L — SIGNIFICANT CHANGE UP
SODIUM UR-SCNC: 70 MMOL/L — SIGNIFICANT CHANGE UP
SP GR SPEC: 1.02 — SIGNIFICANT CHANGE UP (ref 1–1.03)
SP GR SPEC: 1.02 — SIGNIFICANT CHANGE UP (ref 1–1.03)
TROPONIN T, HIGH SENSITIVITY RESULT: 13 NG/L — SIGNIFICANT CHANGE UP (ref 0–51)
TROPONIN T, HIGH SENSITIVITY RESULT: 13 NG/L — SIGNIFICANT CHANGE UP (ref 0–51)
TSH SERPL-MCNC: 1.28 UIU/ML — SIGNIFICANT CHANGE UP (ref 0.27–4.2)
TSH SERPL-MCNC: 1.28 UIU/ML — SIGNIFICANT CHANGE UP (ref 0.27–4.2)
UROBILINOGEN FLD QL: 1 MG/DL — SIGNIFICANT CHANGE UP (ref 0.2–1)
UROBILINOGEN FLD QL: 1 MG/DL — SIGNIFICANT CHANGE UP (ref 0.2–1)
WBC # BLD: 4.8 K/UL — SIGNIFICANT CHANGE UP (ref 3.8–10.5)
WBC # BLD: 4.8 K/UL — SIGNIFICANT CHANGE UP (ref 3.8–10.5)
WBC # FLD AUTO: 4.8 K/UL — SIGNIFICANT CHANGE UP (ref 3.8–10.5)
WBC # FLD AUTO: 4.8 K/UL — SIGNIFICANT CHANGE UP (ref 3.8–10.5)

## 2023-12-17 PROCEDURE — 93010 ELECTROCARDIOGRAM REPORT: CPT

## 2023-12-17 PROCEDURE — 99285 EMERGENCY DEPT VISIT HI MDM: CPT

## 2023-12-17 PROCEDURE — 74018 RADEX ABDOMEN 1 VIEW: CPT | Mod: 26

## 2023-12-17 PROCEDURE — 71045 X-RAY EXAM CHEST 1 VIEW: CPT | Mod: 26

## 2023-12-17 PROCEDURE — 99222 1ST HOSP IP/OBS MODERATE 55: CPT | Mod: GC

## 2023-12-17 RX ORDER — RILPIVIRINE HYDROCHLORIDE 25 MG/1
25 TABLET, FILM COATED ORAL
Refills: 0 | Status: DISCONTINUED | OUTPATIENT
Start: 2023-12-17 | End: 2023-12-17

## 2023-12-17 RX ORDER — ONDANSETRON 8 MG/1
4 TABLET, FILM COATED ORAL EVERY 8 HOURS
Refills: 0 | Status: DISCONTINUED | OUTPATIENT
Start: 2023-12-17 | End: 2023-12-18

## 2023-12-17 RX ORDER — ENOXAPARIN SODIUM 100 MG/ML
40 INJECTION SUBCUTANEOUS EVERY 24 HOURS
Refills: 0 | Status: DISCONTINUED | OUTPATIENT
Start: 2023-12-17 | End: 2023-12-18

## 2023-12-17 RX ORDER — IPRATROPIUM/ALBUTEROL SULFATE 18-103MCG
3 AEROSOL WITH ADAPTER (GRAM) INHALATION EVERY 6 HOURS
Refills: 0 | Status: DISCONTINUED | OUTPATIENT
Start: 2023-12-17 | End: 2023-12-18

## 2023-12-17 RX ORDER — RILPIVIRINE HYDROCHLORIDE 25 MG/1
25 TABLET, FILM COATED ORAL DAILY
Refills: 0 | Status: DISCONTINUED | OUTPATIENT
Start: 2023-12-17 | End: 2023-12-18

## 2023-12-17 RX ORDER — SODIUM CHLORIDE 9 MG/ML
1 INJECTION INTRAMUSCULAR; INTRAVENOUS; SUBCUTANEOUS THREE TIMES A DAY
Refills: 0 | Status: DISCONTINUED | OUTPATIENT
Start: 2023-12-17 | End: 2023-12-18

## 2023-12-17 RX ORDER — ATORVASTATIN CALCIUM 80 MG/1
20 TABLET, FILM COATED ORAL AT BEDTIME
Refills: 0 | Status: DISCONTINUED | OUTPATIENT
Start: 2023-12-17 | End: 2023-12-18

## 2023-12-17 RX ORDER — SODIUM CHLORIDE 5 G/100ML
500 INJECTION, SOLUTION INTRAVENOUS
Refills: 0 | Status: DISCONTINUED | OUTPATIENT
Start: 2023-12-17 | End: 2023-12-17

## 2023-12-17 RX ORDER — LANOLIN ALCOHOL/MO/W.PET/CERES
3 CREAM (GRAM) TOPICAL AT BEDTIME
Refills: 0 | Status: DISCONTINUED | OUTPATIENT
Start: 2023-12-17 | End: 2023-12-18

## 2023-12-17 RX ORDER — AMLODIPINE BESYLATE 2.5 MG/1
5 TABLET ORAL DAILY
Refills: 0 | Status: DISCONTINUED | OUTPATIENT
Start: 2023-12-17 | End: 2023-12-18

## 2023-12-17 RX ORDER — ACETAMINOPHEN 500 MG
650 TABLET ORAL EVERY 6 HOURS
Refills: 0 | Status: DISCONTINUED | OUTPATIENT
Start: 2023-12-17 | End: 2023-12-18

## 2023-12-17 RX ORDER — TAMSULOSIN HYDROCHLORIDE 0.4 MG/1
0.4 CAPSULE ORAL AT BEDTIME
Refills: 0 | Status: DISCONTINUED | OUTPATIENT
Start: 2023-12-17 | End: 2023-12-18

## 2023-12-17 RX ORDER — DOLUTEGRAVIR SODIUM 25 MG/1
50 TABLET, FILM COATED ORAL DAILY
Refills: 0 | Status: DISCONTINUED | OUTPATIENT
Start: 2023-12-17 | End: 2023-12-17

## 2023-12-17 RX ORDER — DOLUTEGRAVIR SODIUM 25 MG/1
50 TABLET, FILM COATED ORAL DAILY
Refills: 0 | Status: DISCONTINUED | OUTPATIENT
Start: 2023-12-17 | End: 2023-12-18

## 2023-12-17 RX ORDER — SODIUM CHLORIDE 5 G/100ML
500 INJECTION, SOLUTION INTRAVENOUS
Refills: 0 | Status: COMPLETED | OUTPATIENT
Start: 2023-12-17 | End: 2023-12-18

## 2023-12-17 RX ORDER — ALPRAZOLAM 0.25 MG
0.5 TABLET ORAL ONCE
Refills: 0 | Status: DISCONTINUED | OUTPATIENT
Start: 2023-12-17 | End: 2023-12-17

## 2023-12-17 RX ADMIN — RILPIVIRINE HYDROCHLORIDE 25 MILLIGRAM(S): 25 TABLET, FILM COATED ORAL at 18:51

## 2023-12-17 RX ADMIN — SODIUM CHLORIDE 1 GRAM(S): 9 INJECTION INTRAMUSCULAR; INTRAVENOUS; SUBCUTANEOUS at 21:35

## 2023-12-17 RX ADMIN — AMLODIPINE BESYLATE 5 MILLIGRAM(S): 2.5 TABLET ORAL at 21:36

## 2023-12-17 RX ADMIN — Medication 0.5 MILLIGRAM(S): at 10:41

## 2023-12-17 RX ADMIN — ATORVASTATIN CALCIUM 20 MILLIGRAM(S): 80 TABLET, FILM COATED ORAL at 21:36

## 2023-12-17 RX ADMIN — DOLUTEGRAVIR SODIUM 50 MILLIGRAM(S): 25 TABLET, FILM COATED ORAL at 18:51

## 2023-12-17 RX ADMIN — TAMSULOSIN HYDROCHLORIDE 0.4 MILLIGRAM(S): 0.4 CAPSULE ORAL at 21:35

## 2023-12-17 RX ADMIN — SODIUM CHLORIDE 1 GRAM(S): 9 INJECTION INTRAMUSCULAR; INTRAVENOUS; SUBCUTANEOUS at 18:51

## 2023-12-17 NOTE — ED ADULT NURSE NOTE - PRO INTERPRETER NEED 2
Scheduled Via: Case Request Order for 84S Covid test 8/28 Donaldo  Procedure date: 8/30  Procedure time:  ;If facility doesn't give time, did patient request a specific time? No; If so, what time was requested?   Rep Contacted?: No     The following have been confirmed:  Insurance name confirmed as [facility], will be the same at time of procedure?: Yes  Insurance Accepted at Facility? Yes     Patient was scheduled with MA in the office  Prep required? Yes, patient was given letter in the office and prep was sent to preferred pharmacy.         English

## 2023-12-17 NOTE — H&P ADULT - PROBLEM SELECTOR PLAN 2
Patient reports urge to urinate frequently overnight causing lost sleep since September 2023. Denies pain in suprapubic area to palpation. No burning, straining/ hesitancy, no issues with bowel movements, fevers/chills.  Consider bladder spams vs BPH,  Denies hx of DM, thyroid disorder, trauma/injury  UA ketone 40    - f/u A1c, TSH  - I&Os to determine true UOP  - post void BS  - consider trial of oxybutynin if workup is otherwise negative Patient reports urge to urinate frequently overnight causing lost sleep since September 2023. Denies pain in suprapubic area to palpation. No burning, straining/ hesitancy, no issues with bowel movements, fevers/chills.  Consider bladder spams vs BPH,  Denies hx of DM, thyroid disorder, trauma/injury  UA ketone 40    - f/u A1c, TSH  - start flowmax  - I&Os to determine true UOP  - post void BS  - consider trial of oxybutynin if workup is otherwise negative Patient reports urge to urinate frequently overnight causing lost sleep since September 2023. Denies pain in suprapubic area to palpation. No burning, straining/ hesitancy, no issues with bowel movements, fevers/chills.  Consider bladder spams vs BPH,  Denies hx of DM, thyroid disorder, trauma/injury  UA ketone 40, A1c 5.5    - f/u TSH  - start flowmax  - I&Os to determine true UOP  - post void BS  - consider trial of oxybutynin if workup is otherwise negative

## 2023-12-17 NOTE — H&P ADULT - NSICDXPASTMEDICALHX_GEN_ALL_CORE_FT
PAST MEDICAL HISTORY:  Asthma Asthma    Asymptomatic human immunodeficiency virus infection HIV (human immunodeficiency virus infection)    HLD (hyperlipidemia)     HTN (hypertension)

## 2023-12-17 NOTE — ED ADULT NURSE NOTE - OBJECTIVE STATEMENT
Pt is a 62yo male PMHx anxiety, HIV, asthma presents to ED c/o chest discomfort. Pt states, "I have had increased trouble sleeping the past 3 months due to having to go to the bathroom every hour. I have also had worse chest pressure and palpitations over the last 3 months". Pt reports seeing PCP beginning of december, starting a new antianxiety medication. Reports today felt shaky which made him come to ER. Pt is A&Ox4, breathing equal and unlabored, denies dizziness, sob, f/c.

## 2023-12-17 NOTE — ED ADULT TRIAGE NOTE - CHIEF COMPLAINT QUOTE
" I have chest pressure and palpation since about 3 months. I have not been able to sleep since 1 month. I feel nervousness and I am shaky. I also keep waking up every night to pee every half hour." hx HIV, HLD, HTN and asthma.

## 2023-12-17 NOTE — ED PROVIDER NOTE - ATTENDING APP SHARED VISIT CONTRIBUTION OF CARE
63 M pmh anxiety, HIV cd4 180s VL undetect, htn, hld p/w chest pressure and palpitations x 3mons and frequent urination x 10 days.   pt reports diffuse chest pressure w/ associated palpitations and feeling very anxious, intermittent for a few mons.  no change w/ exertion.  deep  breaths help.  saw PMD and thought to be anxiety, started on prozac 12/6 and now w/ difficulty sleeping and increased urination (4+ episodes per night) after starting prozac.  denies f/c. HA, dizziness, fainting, uri sxs, abd pain, nvd, trauma    on exam pt anxious appearing but nad, lungs ctab, hrrr, abd soft/nt, no cvat.  r/o uti, cp likely anxiety but will eval for arrhythmia/acs.  will obtain labs, ekg, cxr, give xanax    Addendum to attending statement: I have reviewed the ACP note and agree with the history, exam, and plan of care. I  was available to PA   as a supervising provider if needed. PA given opportunity to ask questions and request further evaluation / care.    Pt well appearing - labs with hyponatremia most likely from SIADH secondary to starting prozac - pt symptomatic with urinary frequency.  To be treated with volume restriction and admission.

## 2023-12-17 NOTE — H&P ADULT - PROBLEM SELECTOR PLAN 1
Possibly 2/2 SIADH vs psychogenic polydipsia   Patient started on fluoxitine 20 mg 12/6 making SIADH due to SSRI more likely  Na 124, Serum osm 264    - f/u urine lytes  - fluid restrict to 1.5 L  - goal Na 132 by 9 AM 12/18  - recheck BMP q 6-8h  - hold fluoxitine for now, consider psych consult for lower dosing/med change  - Monitor for withdrawal symptoms- per [employer] fluoxitine has low risk for SSRI withdrawal Possibly 2/2 SIADH vs psychogenic polydipsia   Patient started on fluoxitine 20 mg 12/6 making SIADH due to SSRI more likely  Na 124, Serum osm 264    - f/u urine lytes  - fluid restrict to 1.5 L  - start salt tab 1 g TID  - goal Na 132 by 9 AM 12/18  - recheck BMP q 6-8h  - hold fluoxitine for now, consider psych consult for lower dosing/med change  - Monitor for withdrawal symptoms- per [employer] fluoxitine has low risk for SSRI withdrawal Possibly 2/2 SIADH vs psychogenic polydipsia   Patient started on fluoxitine 20 mg 12/6 making SIADH due to SSRI more likely  Na 124, Serum osm 264, Urine Na 70, Urine osm 481    - fluid restrict to 1.5 L  - start salt tab 1 g TID  - goal Na 132 by 9 AM 12/18  - recheck BMP q 6-8h  - hold fluoxitine for now, consider psych consult for lower dosing/med change  - Monitor for withdrawal symptoms- per [employer] fluoxitine has low risk for SSRI withdrawal

## 2023-12-17 NOTE — H&P ADULT - PROBLEM SELECTOR PLAN 3
Home medication: fluoxitine 20 mg    - hold home medication iso hyponatremia  - Monitor for withdrawal symptoms- per [employer]pto[employer] fluoxitine has low risk for SSRI withdrawal

## 2023-12-17 NOTE — H&P ADULT - HISTORY OF PRESENT ILLNESS
HPI:     In the ED:  Initial vital signs: T: XX F, HR: XX, BP: XX, R: XX, SpO2: XX% on RA  ED course:   Labs: significant for  Imaging:  CXR:   EKG:   Medications: ALPRAZolam 0.5 milliGRAM(s) Oral Once    Consults: none      HPI: The patient is a 63 M with PMH of HIV (cd4 180s VL undetect), HTN, HLD, and anxiety presents with chest pressure, palpitations, and nocturia (every ~1h) since the end of September with increased frequentcy of urination (to every 30 min) over the past ~ week. Patient complaints of difficulty sleeping due to waking up to urinate. Patient denies buring with urination or straining to make urine. However states that after the first 2-3 awakening + voiding episodes he still feels the urge to urinate even though he feels his bladder is empty,  Patient was started on prozac 12/6 by his PCP Dr. Pierce for the anxiety and patient reports increased frequency of urination during the day since. He states that nothing provokes the panic/anxiety. Deep breathing and going for walks outdoors can provide some temporary relief. When he has an episode he endorses feeling restless, palpitations, and tightness in his chest.   Denies fever, chest pain, shortness of breath, abdominal pain, constipation, diarrhea, blurry/changes in vision  Endorses head pressures, chest pressure, and frequent urination    In the ED:  Initial vital signs: T: 97.5 F, HR: 84, BP: 154/78, R: 18, SpO2: 100% on RA  ED course:   Labs: significant for Na 124, Cl 88, serum Osm 264, urine ketone 40, pending urine Na and osm  CXR: no inflitrate  EKG: NSR  Medications: ALPRAZolam 0.5 milliGRAM(s) Oral Once    Consults: none       Consults: none      HPI: The patient is a 63 M with PMH of HIV (cd4 180s VL undetect), HTN, HLD, and anxiety presents with chest pressure, palpitations, and nocturia (every ~1h) since the end of September with increased frequency of urination (to every 30 min) over the past ~ week. Patient complaints of difficulty sleeping due to waking up to urinate. Patient denies burning with urination or straining to make urine. However states that after the first 2-3 awakening + voiding episodes he still feels the urge to urinate even though he feels his bladder is empty,  Patient was started on prozac 12/6 by his PCP Dr. Pierce for the anxiety and patient reports increased frequency of urination during the day since. He states that nothing provokes the panic/anxiety. Deep breathing and going for walks outdoors can provide some temporary relief. When he has an episode he endorses feeling restless, palpitations, and tightness in his chest.   Denies fever, chest pain, shortness of breath, abdominal pain, constipation, diarrhea, blurry/changes in vision  Endorses head pressures, chest pressure, and frequent urination    In the ED:  Initial vital signs: T: 97.5 F, HR: 84, BP: 154/78, R: 18, SpO2: 100% on RA  ED course:   Labs: significant for Na 124, Cl 88, serum Osm 264, urine ketone 40, pending urine Na and osm  CXR: no infiltrate  EKG: NSR  Medications: ALPRAZolam 0.5 milliGRAM(s) Oral Once    Consults: none

## 2023-12-17 NOTE — H&P ADULT - NSICDXPASTSURGICALHX_GEN_ALL_CORE_FT
PAST SURGICAL HISTORY:  Cytomegalovirus infection not present No significant past surgical history    H/O inguinal hernia repair

## 2023-12-17 NOTE — H&P ADULT - ATTENDING COMMENTS
Pt is 64 yo man with HIV CD4 180, on HAART, undetectable VL, Anxiety/Depression, admitted with complains of frequent urge to urinate, mostly experienced during night time. Pt was recently started on SSRI. Pt is not sexually active. Denied pain and pressure in the inguinal area. On PE no significant abnormalities found, rectal deferred. On labs pt is noted to have hyponatremia and hypochloremia. UA is wnl.   -----for urinary frequency, presume it is due to prostatic enlargement. Pt to be started on Flomax 0.4mg qHS. Will ask Urology team is they have any other suggestions. Meanwhile will order pelvic US to assess prostate size  -----hyponatremia is likely due to SIADH in the setting of recent SSRI initiation. Pt will be given salt tabs with meals and 1.5L free water/day restriction.  If sodium does not improve, pt may benefit from urea supplements and renal follow up. Will follow on TSH  ----for HIV cont with HAART  ----for anxiety and depression, cont with SSRI and follow up with outpatient provider

## 2023-12-17 NOTE — ED PROVIDER NOTE - CLINICAL SUMMARY MEDICAL DECISION MAKING FREE TEXT BOX
63 M pmh anxiety, HIV cd4 180s VL undetect, htn, hld p/w chest pressure and palpitations x 3mons and frequent urination x 10 days.   pt feels cp/palpitations 2/2 anxiety, saw PMD and started on prozac 12/6 and now w/ urinary frequency since.  on exam pt anxious appearing but nad, lungs ctab, hrrr, abd soft/nt, no cvat.  r/o uti, cp likely anxiety but will eval for arrhythmia/acs.  will obtain labs, ekg, cxr, give xanax

## 2023-12-17 NOTE — H&P ADULT - PROBLEM SELECTOR PLAN 4
Home medications: Edurant 25 mg qd, tivicay 50 mg qd, bactrim 1 DS tab 3 days a week for CD4 <200    - c/w home medications  - no signs of sepsis/infection at this time, hold off on checking CD4

## 2023-12-17 NOTE — PATIENT PROFILE ADULT - FALL HARM RISK - UNIVERSAL INTERVENTIONS
Bed in lowest position, wheels locked, appropriate side rails in place/Call bell, personal items and telephone in reach/Instruct patient to call for assistance before getting out of bed or chair/Non-slip footwear when patient is out of bed/[place] to call system/Physically safe environment - no spills, clutter or unnecessary equipment/Purposeful Proactive Rounding/Room/bathroom lighting operational, light cord in reach Bed in lowest position, wheels locked, appropriate side rails in place/Call bell, personal items and telephone in reach/Instruct patient to call for assistance before getting out of bed or chair/Non-slip footwear when patient is out of bed/[place] to call system/Physically safe environment - no spills, clutter or unnecessary equipment/Purposeful Proactive Rounding/Room/bathroom lighting operational, light cord in reach

## 2023-12-17 NOTE — ED PROVIDER NOTE - PHYSICAL EXAMINATION
Vitals reviewed  Gen: anxious appearing, nad, speaking in full sentences  Skin: wwp, no rash/lesions  HEENT: ncat, eomi, mmm  CV: rrr, no audible m/r/g  Resp: symmetrical expansion, ctab, no w/r/r  Abd: nondistended, soft/nt  Ext: FROM throughout, no peripheral edema  Neuro: alert/oriented, no focal deficits, steady gait

## 2023-12-17 NOTE — H&P ADULT - PROBLEM SELECTOR PLAN 8
Nurse Dez if taking care of It    Fluids: restrict 1.5 L  Electrolytes: replete K<4 and Mg<2  Diet: Dash  DVT ppx: Lovenox SQ  Activity: Ambulate as tolerated  Code: Full

## 2023-12-17 NOTE — H&P ADULT - NSHPPHYSICALEXAM_GEN_ALL_CORE
GENERAL: NAD, sitting in bed comfortably, thin body habitus  HEAD:  Atraumatic, normocephalic  EYES: EOMI, conjunctiva and sclera clear  NECK: Supple, trachea midline, no JVD  HEART: Regular rate and rhythm, no murmurs, rubs, or gallops  LUNGS: Unlabored respirations.  Clear to auscultation bilaterally, no crackles, wheezing, or rhonchi  ABDOMEN: Soft, nontender, nondistended, +BS, no tenderness over suprapubic area  EXTREMITIES: 2+ peripheral pulses bilaterally. No clubbing, cyanosis, or edema  NERVOUS SYSTEM:  A&Ox3, moving all extremities, no focal deficits   SKIN: No rashes or lesions

## 2023-12-17 NOTE — H&P ADULT - SOCIAL HISTORY: ALCOHOL USE
Occasional alcohol consumption for special occasions/dinners.  None since panic attacks started in September 2023

## 2023-12-17 NOTE — H&P ADULT - NSVTERISKASSESS_GEN_ALL_CORE FT
RECEIVED IN NO ACUTE DISTRESS. AWAKE, ALERT AND ORIENTED. VS WNL. HL PATENT.
NO C/O PAIN OR DISCOMFORT AT THIS TIME. CALL LIGHT WITHIN REACH. WILL CONTINUE
WITH PLAN OF CARE. Medical Assessment Completed on: 17-Dec-2023 12:02

## 2023-12-17 NOTE — H&P ADULT - PROBLEM SELECTOR PLAN 7
Home medication: Anoro Ellipta 62.5-25    - have partner bring medication so patient can resume in hospital

## 2023-12-17 NOTE — H&P ADULT - NSHPLABSRESULTS_GEN_ALL_CORE
LABS:                         15.8   4.80  )-----------( 218      ( 17 Dec 2023 09:11 )             45.6     12-17    x   |  x   |  x   ----------------------------<  x   x    |  x   |  0.73    Ca    9.5      17 Dec 2023 09:11        Urinalysis Basic - ( 17 Dec 2023 09:11 )    Color: Yellow / Appearance: Clear / S.017 / pH: x  Gluc: 103 mg/dL / Ketone: 40 mg/dL  / Bili: Negative / Urobili: 1.0 mg/dL   Blood: x / Protein: Negative mg/dL / Nitrite: Negative   Leuk Esterase: Negative / RBC: x / WBC x   Sq Epi: x / Non Sq Epi: x / Bacteria: x                RADIOLOGY, EKG & ADDITIONAL TESTS: Reviewed.

## 2023-12-17 NOTE — ED ADULT NURSE NOTE - NSFALLUNIVINTERV_ED_ALL_ED
Bed/Stretcher in lowest position, wheels locked, appropriate side rails in place/Call bell, personal items and telephone in reach/Instruct patient to call for assistance before getting out of bed/chair/stretcher/Non-slip footwear applied when patient is off stretcher/[place] to call system/Physically safe environment - no spills, clutter or unnecessary equipment/Purposeful proactive rounding/Room/bathroom lighting operational, light cord in reach Bed/Stretcher in lowest position, wheels locked, appropriate side rails in place/Call bell, personal items and telephone in reach/Instruct patient to call for assistance before getting out of bed/chair/stretcher/Non-slip footwear applied when patient is off stretcher/[place] to call system/Physically safe environment - no spills, clutter or unnecessary equipment/Purposeful proactive rounding/Room/bathroom lighting operational, light cord in reach

## 2023-12-17 NOTE — H&P ADULT - ASSESSMENT
The patient is a 63 M with PMH of HIV (cd4 180s VL undetect), HTN, HLD, and anxiety presents with chest pressure, palpitations, and nocturia since the end of September with increased frequency of urination over the past ~ week, found to have hyponatremia, and admitted to medicine for further workup of SIADH. The patient is a 63 M with PMH of HIV (cd4 180s VL undetect), HTN, HLD, and anxiety presents with chest pressure, palpitations, and nocturia since the end of September with increased frequency of urination over the past ~ week, found to have hyponatremia, and admitted to medicine for further workup.

## 2023-12-17 NOTE — ED PROVIDER NOTE - CARE PLAN
1 Principal Discharge DX:	Hyponatremia  Secondary Diagnosis:	Palpitations  Secondary Diagnosis:	Polyuria

## 2023-12-17 NOTE — ED PROVIDER NOTE - OBJECTIVE STATEMENT
63 M pmh anxiety, HIV cd4 180s VL undetect, htn, hld p/w chest pressure and palpitations x 3mons and frequent urination x 10 days.   difficulty sleeping and increased urination (4+ episodes per night) after starting prozac 12/6 63 M pmh anxiety, HIV cd4 180s VL undetect, htn, hld p/w chest pressure and palpitations x 3mons and frequent urination x 10 days.   pt reports diffuse chest pressure w/ associated palpitations and feeling very anxious, intermittent for a few mons.  no change w/ exertion.  deep  breaths help.  saw PMD and thought to be anxiety, started on prozac 12/6 and now w/ difficulty sleeping and increased urination (4+ episodes per night) after starting prozac.  denies f/c. HA, dizziness, fainting, uri sxs, abd pain, nvd, trauma

## 2023-12-17 NOTE — ED ADULT TRIAGE NOTE - BP NONINVASIVE SYSTOLIC (MM HG)
Jacqueline Miller is a 65 y.o. female patient.   No diagnosis found.  Past Medical History:   Diagnosis Date    SRUTHI positive     CAD (coronary artery disease)     CKD (chronic kidney disease)     Degenerative joint disease     Fibromyalgia     Glomus tympanicum tumor     Heart attack     Hepatitis C     Hypertension     ICD (implantable cardioverter-defibrillator) in place     Left sided sciatica     Noncompliance with medication regimen     Nonischemic cardiomyopathy     Obesity     Seborrheic keratoses     Tinea corporis     Undifferentiated connective tissue disease     Ventricular fibrillation     Vitamin D deficiency      No past surgical history pertinent negatives on file.  Scheduled Meds:  Continuous Infusions:  PRN Meds:    Review of patient's allergies indicates:   Allergen Reactions    Ketorolac      Other reaction(s): Tongue finding  slurred speech    Penicillins Hives    Cholecalciferol (vitamin d3) Edema     Other reaction(s): Hand and joint of hands swelling    Flexeril [cyclobenzaprine]      There are no hospital problems to display for this patient.    Blood pressure (!) 185/108, pulse 86, height 6' (1.829 m), weight 128.4 kg (283 lb).    Subjective:  The patient returns status post removal of dysfunctional defibrillator lead and replacement of defibrillator generator.  She still reports some pain at the site of the insertion.      Objective:  On exam the wounds have healed very well.  No evidence of hardening of an infection.      Assessment & Plan:  Wound healing well the patient is still reporting some pain at the site of the device.  I advised her to give it a little more time as she is still healing.  RTC psamantha Kumar MD  10/5/2022      
154

## 2023-12-17 NOTE — ED ADULT NURSE NOTE - NSICDXPASTMEDICALHX_GEN_ALL_CORE_FT
PAST MEDICAL HISTORY:  Asthma Asthma    Asymptomatic human immunodeficiency virus infection HIV (human immunodeficiency virus infection)    HTN (hypertension)

## 2023-12-18 ENCOUNTER — TRANSCRIPTION ENCOUNTER (OUTPATIENT)
Age: 63
End: 2023-12-18

## 2023-12-18 VITALS
DIASTOLIC BLOOD PRESSURE: 73 MMHG | OXYGEN SATURATION: 99 % | HEART RATE: 84 BPM | SYSTOLIC BLOOD PRESSURE: 150 MMHG | RESPIRATION RATE: 18 BRPM | TEMPERATURE: 97 F

## 2023-12-18 DIAGNOSIS — R35.0 FREQUENCY OF MICTURITION: ICD-10-CM

## 2023-12-18 LAB
ANION GAP SERPL CALC-SCNC: 10 MMOL/L — SIGNIFICANT CHANGE UP (ref 5–17)
ANION GAP SERPL CALC-SCNC: 10 MMOL/L — SIGNIFICANT CHANGE UP (ref 5–17)
ANION GAP SERPL CALC-SCNC: 12 MMOL/L — SIGNIFICANT CHANGE UP (ref 5–17)
ANION GAP SERPL CALC-SCNC: 12 MMOL/L — SIGNIFICANT CHANGE UP (ref 5–17)
BASOPHILS # BLD AUTO: 0.05 K/UL — SIGNIFICANT CHANGE UP (ref 0–0.2)
BASOPHILS # BLD AUTO: 0.05 K/UL — SIGNIFICANT CHANGE UP (ref 0–0.2)
BASOPHILS NFR BLD AUTO: 1.2 % — SIGNIFICANT CHANGE UP (ref 0–2)
BASOPHILS NFR BLD AUTO: 1.2 % — SIGNIFICANT CHANGE UP (ref 0–2)
BUN SERPL-MCNC: 18 MG/DL — SIGNIFICANT CHANGE UP (ref 7–23)
CALCIUM SERPL-MCNC: 9 MG/DL — SIGNIFICANT CHANGE UP (ref 8.4–10.5)
CALCIUM SERPL-MCNC: 9 MG/DL — SIGNIFICANT CHANGE UP (ref 8.4–10.5)
CALCIUM SERPL-MCNC: 9.6 MG/DL — SIGNIFICANT CHANGE UP (ref 8.4–10.5)
CALCIUM SERPL-MCNC: 9.6 MG/DL — SIGNIFICANT CHANGE UP (ref 8.4–10.5)
CHLORIDE SERPL-SCNC: 91 MMOL/L — LOW (ref 96–108)
CHLORIDE SERPL-SCNC: 91 MMOL/L — LOW (ref 96–108)
CHLORIDE SERPL-SCNC: 93 MMOL/L — LOW (ref 96–108)
CHLORIDE SERPL-SCNC: 93 MMOL/L — LOW (ref 96–108)
CO2 SERPL-SCNC: 22 MMOL/L — SIGNIFICANT CHANGE UP (ref 22–31)
CO2 SERPL-SCNC: 22 MMOL/L — SIGNIFICANT CHANGE UP (ref 22–31)
CO2 SERPL-SCNC: 25 MMOL/L — SIGNIFICANT CHANGE UP (ref 22–31)
CO2 SERPL-SCNC: 25 MMOL/L — SIGNIFICANT CHANGE UP (ref 22–31)
CREAT SERPL-MCNC: 0.75 MG/DL — SIGNIFICANT CHANGE UP (ref 0.5–1.3)
CREAT SERPL-MCNC: 0.75 MG/DL — SIGNIFICANT CHANGE UP (ref 0.5–1.3)
CREAT SERPL-MCNC: 0.84 MG/DL — SIGNIFICANT CHANGE UP (ref 0.5–1.3)
CREAT SERPL-MCNC: 0.84 MG/DL — SIGNIFICANT CHANGE UP (ref 0.5–1.3)
EGFR: 101 ML/MIN/1.73M2 — SIGNIFICANT CHANGE UP
EGFR: 101 ML/MIN/1.73M2 — SIGNIFICANT CHANGE UP
EGFR: 98 ML/MIN/1.73M2 — SIGNIFICANT CHANGE UP
EGFR: 98 ML/MIN/1.73M2 — SIGNIFICANT CHANGE UP
EOSINOPHIL # BLD AUTO: 0.07 K/UL — SIGNIFICANT CHANGE UP (ref 0–0.5)
EOSINOPHIL # BLD AUTO: 0.07 K/UL — SIGNIFICANT CHANGE UP (ref 0–0.5)
EOSINOPHIL NFR BLD AUTO: 1.7 % — SIGNIFICANT CHANGE UP (ref 0–6)
EOSINOPHIL NFR BLD AUTO: 1.7 % — SIGNIFICANT CHANGE UP (ref 0–6)
GLUCOSE SERPL-MCNC: 111 MG/DL — HIGH (ref 70–99)
GLUCOSE SERPL-MCNC: 111 MG/DL — HIGH (ref 70–99)
GLUCOSE SERPL-MCNC: 94 MG/DL — SIGNIFICANT CHANGE UP (ref 70–99)
GLUCOSE SERPL-MCNC: 94 MG/DL — SIGNIFICANT CHANGE UP (ref 70–99)
HCT VFR BLD CALC: 39.4 % — SIGNIFICANT CHANGE UP (ref 39–50)
HCT VFR BLD CALC: 39.4 % — SIGNIFICANT CHANGE UP (ref 39–50)
HCV AB S/CO SERPL IA: 0.04 S/CO — SIGNIFICANT CHANGE UP
HCV AB S/CO SERPL IA: 0.04 S/CO — SIGNIFICANT CHANGE UP
HCV AB SERPL-IMP: SIGNIFICANT CHANGE UP
HCV AB SERPL-IMP: SIGNIFICANT CHANGE UP
HGB BLD-MCNC: 13.5 G/DL — SIGNIFICANT CHANGE UP (ref 13–17)
HGB BLD-MCNC: 13.5 G/DL — SIGNIFICANT CHANGE UP (ref 13–17)
IMM GRANULOCYTES NFR BLD AUTO: 0.5 % — SIGNIFICANT CHANGE UP (ref 0–0.9)
IMM GRANULOCYTES NFR BLD AUTO: 0.5 % — SIGNIFICANT CHANGE UP (ref 0–0.9)
LYMPHOCYTES # BLD AUTO: 0.78 K/UL — LOW (ref 1–3.3)
LYMPHOCYTES # BLD AUTO: 0.78 K/UL — LOW (ref 1–3.3)
LYMPHOCYTES # BLD AUTO: 18.8 % — SIGNIFICANT CHANGE UP (ref 13–44)
LYMPHOCYTES # BLD AUTO: 18.8 % — SIGNIFICANT CHANGE UP (ref 13–44)
MAGNESIUM SERPL-MCNC: 1.8 MG/DL — SIGNIFICANT CHANGE UP (ref 1.6–2.6)
MAGNESIUM SERPL-MCNC: 1.8 MG/DL — SIGNIFICANT CHANGE UP (ref 1.6–2.6)
MCHC RBC-ENTMCNC: 28.1 PG — SIGNIFICANT CHANGE UP (ref 27–34)
MCHC RBC-ENTMCNC: 28.1 PG — SIGNIFICANT CHANGE UP (ref 27–34)
MCHC RBC-ENTMCNC: 34.3 GM/DL — SIGNIFICANT CHANGE UP (ref 32–36)
MCHC RBC-ENTMCNC: 34.3 GM/DL — SIGNIFICANT CHANGE UP (ref 32–36)
MCV RBC AUTO: 81.9 FL — SIGNIFICANT CHANGE UP (ref 80–100)
MCV RBC AUTO: 81.9 FL — SIGNIFICANT CHANGE UP (ref 80–100)
MONOCYTES # BLD AUTO: 0.49 K/UL — SIGNIFICANT CHANGE UP (ref 0–0.9)
MONOCYTES # BLD AUTO: 0.49 K/UL — SIGNIFICANT CHANGE UP (ref 0–0.9)
MONOCYTES NFR BLD AUTO: 11.8 % — SIGNIFICANT CHANGE UP (ref 2–14)
MONOCYTES NFR BLD AUTO: 11.8 % — SIGNIFICANT CHANGE UP (ref 2–14)
NEUTROPHILS # BLD AUTO: 2.74 K/UL — SIGNIFICANT CHANGE UP (ref 1.8–7.4)
NEUTROPHILS # BLD AUTO: 2.74 K/UL — SIGNIFICANT CHANGE UP (ref 1.8–7.4)
NEUTROPHILS NFR BLD AUTO: 66 % — SIGNIFICANT CHANGE UP (ref 43–77)
NEUTROPHILS NFR BLD AUTO: 66 % — SIGNIFICANT CHANGE UP (ref 43–77)
NRBC # BLD: 0 /100 WBCS — SIGNIFICANT CHANGE UP (ref 0–0)
NRBC # BLD: 0 /100 WBCS — SIGNIFICANT CHANGE UP (ref 0–0)
OSMOLALITY SERPL: 259 MOSM/KG — LOW (ref 280–301)
OSMOLALITY SERPL: 259 MOSM/KG — LOW (ref 280–301)
PHOSPHATE SERPL-MCNC: 3.5 MG/DL — SIGNIFICANT CHANGE UP (ref 2.5–4.5)
PHOSPHATE SERPL-MCNC: 3.5 MG/DL — SIGNIFICANT CHANGE UP (ref 2.5–4.5)
PLATELET # BLD AUTO: 185 K/UL — SIGNIFICANT CHANGE UP (ref 150–400)
PLATELET # BLD AUTO: 185 K/UL — SIGNIFICANT CHANGE UP (ref 150–400)
POTASSIUM SERPL-MCNC: 4.2 MMOL/L — SIGNIFICANT CHANGE UP (ref 3.5–5.3)
POTASSIUM SERPL-MCNC: 4.2 MMOL/L — SIGNIFICANT CHANGE UP (ref 3.5–5.3)
POTASSIUM SERPL-MCNC: 4.9 MMOL/L — SIGNIFICANT CHANGE UP (ref 3.5–5.3)
POTASSIUM SERPL-MCNC: 4.9 MMOL/L — SIGNIFICANT CHANGE UP (ref 3.5–5.3)
POTASSIUM SERPL-SCNC: 4.2 MMOL/L — SIGNIFICANT CHANGE UP (ref 3.5–5.3)
POTASSIUM SERPL-SCNC: 4.2 MMOL/L — SIGNIFICANT CHANGE UP (ref 3.5–5.3)
POTASSIUM SERPL-SCNC: 4.9 MMOL/L — SIGNIFICANT CHANGE UP (ref 3.5–5.3)
POTASSIUM SERPL-SCNC: 4.9 MMOL/L — SIGNIFICANT CHANGE UP (ref 3.5–5.3)
PSA FLD-MCNC: 0.24 NG/ML — SIGNIFICANT CHANGE UP (ref 0–4)
PSA FLD-MCNC: 0.24 NG/ML — SIGNIFICANT CHANGE UP (ref 0–4)
RBC # BLD: 4.81 M/UL — SIGNIFICANT CHANGE UP (ref 4.2–5.8)
RBC # BLD: 4.81 M/UL — SIGNIFICANT CHANGE UP (ref 4.2–5.8)
RBC # FLD: 12.2 % — SIGNIFICANT CHANGE UP (ref 10.3–14.5)
RBC # FLD: 12.2 % — SIGNIFICANT CHANGE UP (ref 10.3–14.5)
SODIUM SERPL-SCNC: 125 MMOL/L — LOW (ref 135–145)
SODIUM SERPL-SCNC: 125 MMOL/L — LOW (ref 135–145)
SODIUM SERPL-SCNC: 128 MMOL/L — LOW (ref 135–145)
SODIUM SERPL-SCNC: 128 MMOL/L — LOW (ref 135–145)
WBC # BLD: 4.15 K/UL — SIGNIFICANT CHANGE UP (ref 3.8–10.5)
WBC # BLD: 4.15 K/UL — SIGNIFICANT CHANGE UP (ref 3.8–10.5)
WBC # FLD AUTO: 4.15 K/UL — SIGNIFICANT CHANGE UP (ref 3.8–10.5)
WBC # FLD AUTO: 4.15 K/UL — SIGNIFICANT CHANGE UP (ref 3.8–10.5)

## 2023-12-18 PROCEDURE — 83735 ASSAY OF MAGNESIUM: CPT

## 2023-12-18 PROCEDURE — 99239 HOSP IP/OBS DSCHRG MGMT >30: CPT | Mod: GC

## 2023-12-18 PROCEDURE — 84443 ASSAY THYROID STIM HORMONE: CPT

## 2023-12-18 PROCEDURE — 84300 ASSAY OF URINE SODIUM: CPT

## 2023-12-18 PROCEDURE — 99285 EMERGENCY DEPT VISIT HI MDM: CPT | Mod: 25

## 2023-12-18 PROCEDURE — 36415 COLL VENOUS BLD VENIPUNCTURE: CPT

## 2023-12-18 PROCEDURE — 84100 ASSAY OF PHOSPHORUS: CPT

## 2023-12-18 PROCEDURE — 85025 COMPLETE CBC W/AUTO DIFF WBC: CPT

## 2023-12-18 PROCEDURE — 81003 URINALYSIS AUTO W/O SCOPE: CPT

## 2023-12-18 PROCEDURE — 80048 BASIC METABOLIC PNL TOTAL CA: CPT

## 2023-12-18 PROCEDURE — 82565 ASSAY OF CREATININE: CPT

## 2023-12-18 PROCEDURE — 74018 RADEX ABDOMEN 1 VIEW: CPT

## 2023-12-18 PROCEDURE — G0103: CPT

## 2023-12-18 PROCEDURE — 86803 HEPATITIS C AB TEST: CPT

## 2023-12-18 PROCEDURE — 83935 ASSAY OF URINE OSMOLALITY: CPT

## 2023-12-18 PROCEDURE — 83036 HEMOGLOBIN GLYCOSYLATED A1C: CPT

## 2023-12-18 PROCEDURE — 83930 ASSAY OF BLOOD OSMOLALITY: CPT

## 2023-12-18 PROCEDURE — 71045 X-RAY EXAM CHEST 1 VIEW: CPT

## 2023-12-18 PROCEDURE — 93005 ELECTROCARDIOGRAM TRACING: CPT

## 2023-12-18 PROCEDURE — 84484 ASSAY OF TROPONIN QUANT: CPT

## 2023-12-18 RX ORDER — UREA 15 G
15 POWDER IN PACKET (EA) ORAL EVERY 24 HOURS
Refills: 0 | Status: DISCONTINUED | OUTPATIENT
Start: 2023-12-18 | End: 2023-12-18

## 2023-12-18 RX ORDER — UREA 15 G
15 POWDER IN PACKET (EA) ORAL DAILY
Refills: 0 | Status: DISCONTINUED | OUTPATIENT
Start: 2023-12-18 | End: 2023-12-18

## 2023-12-18 RX ORDER — SODIUM CHLORIDE 9 MG/ML
1 INJECTION INTRAMUSCULAR; INTRAVENOUS; SUBCUTANEOUS
Qty: 90 | Refills: 1
Start: 2023-12-18 | End: 2024-02-15

## 2023-12-18 RX ORDER — FLUOXETINE HCL 10 MG
1 CAPSULE ORAL
Refills: 0 | DISCHARGE

## 2023-12-18 RX ADMIN — DOLUTEGRAVIR SODIUM 50 MILLIGRAM(S): 25 TABLET, FILM COATED ORAL at 13:15

## 2023-12-18 RX ADMIN — SODIUM CHLORIDE 30 MILLILITER(S): 5 INJECTION, SOLUTION INTRAVENOUS at 03:49

## 2023-12-18 RX ADMIN — RILPIVIRINE HYDROCHLORIDE 25 MILLIGRAM(S): 25 TABLET, FILM COATED ORAL at 13:15

## 2023-12-18 RX ADMIN — SODIUM CHLORIDE 1 GRAM(S): 9 INJECTION INTRAMUSCULAR; INTRAVENOUS; SUBCUTANEOUS at 06:21

## 2023-12-18 RX ADMIN — Medication 1 TABLET(S): at 09:26

## 2023-12-18 RX ADMIN — SODIUM CHLORIDE 1 GRAM(S): 9 INJECTION INTRAMUSCULAR; INTRAVENOUS; SUBCUTANEOUS at 13:22

## 2023-12-18 NOTE — DISCHARGE NOTE PROVIDER - NSDCFUADDAPPT_GEN_ALL_CORE_FT
Please follow up with your primary care doctor to monitor your sodium levels and manage your anxiety.    Please follow up with a Urologist for your urinary symptoms.

## 2023-12-18 NOTE — DISCHARGE NOTE PROVIDER - CARE PROVIDER_API CALL
Stefany Gillespie   Urology  [address], [place] [address]  Phone: [phone]  Fax: [phone]  Follow Up Time: 2 weeks   Stefany Gillespie   Urology  [address], [place] [address]  Phone: [phone]  Fax: [phone]  Follow Up Time: 2 weeks

## 2023-12-18 NOTE — PROGRESS NOTE ADULT - PROBLEM SELECTOR PLAN 2
Patient reports urge to urinate frequently overnight causing lost sleep since September 2023. Denies pain in suprapubic area to palpation. No burning, straining/ hesitancy, no issues with bowel movements, fevers/chills.  Consider bladder spams vs BPH,  Denies hx of DM, thyroid disorder, trauma/injury  UA ketone 40, A1c 5.5    - f/u TSH  - start flowmax  - I&Os to determine true UOP  - post void BS  - consider trial of oxybutynin if workup is otherwise negative Patient reports urge to urinate frequently overnight causing lost sleep since September 2023. Denies pain in suprapubic area to palpation. No burning, straining/ hesitancy, no issues with bowel movements, fevers/chills.  Consider bladder spams vs BPH,  Denies hx of DM, thyroid disorder, trauma/injury  UA ketone 40, A1c 5.5    - F/u Urology recs   - f/u TSH - WNL  - start flowmax  - I&Os to determine true UOP  - post void BS  - consider trial of oxybutynin if workup is otherwise negative

## 2023-12-18 NOTE — DISCHARGE NOTE PROVIDER - NSDCMRMEDTOKEN_GEN_ALL_CORE_FT
Anoro Ellipta 62.5 mcg-25 mcg/inh inhalation powder: 1 puff(s) inhaled once a day  atorvastatin 20 mg oral tablet: 1 tab(s) orally once a day (at bedtime)  Edurant 25 mg oral tablet: 1 tab(s) orally once a day  FLUoxetine 20 mg oral tablet: 1 tab(s) orally once a day  Norvasc 5 mg oral tablet: 1 tab(s) orally once a day   sulfamethoxazole-trimethoprim 800 mg-160 mg oral tablet: 1 tab(s) orally 3 times a week  Tivicay 50 mg oral tablet: 1 tab(s) orally once a day  Ventolin 90 mcg/inh inhalation aerosol: 2 puff(s) inhaled every 6 hours as needed for  shortness of breath and/or wheezing   Anoro Ellipta 62.5 mcg-25 mcg/inh inhalation powder: 1 puff(s) inhaled once a day  atorvastatin 20 mg oral tablet: 1 tab(s) orally once a day (at bedtime)  Edurant 25 mg oral tablet: 1 tab(s) orally once a day  Norvasc 5 mg oral tablet: 1 tab(s) orally once a day   Sodium Chloride 1 g oral tablet: 1 tab(s) orally 3 times a day  sulfamethoxazole-trimethoprim 800 mg-160 mg oral tablet: 1 tab(s) orally 3 times a week  Tivicay 50 mg oral tablet: 1 tab(s) orally once a day  Ventolin 90 mcg/inh inhalation aerosol: 2 puff(s) inhaled every 6 hours as needed for  shortness of breath and/or wheezing

## 2023-12-18 NOTE — CONSULT NOTE ADULT - PROBLEM SELECTOR RECOMMENDATION 9
-no emergent urologic intervention indicated at this time   - please reconsult if patient is still having increased urinary frequency once SIADH vs. psychogenic polydipsia is worked up and treated   - would recommend stopping flomax as patient is not having difficulty urinating   - rest of care as per primary team   - discussed with  Attending Dr. Gillespie -no emergent urologic intervention indicated at this time   - please reconsult if patient is still having increased urinary frequency once SIADH vs. psychogenic polydipsia is worked up and treated   - patient should follow up with urology outpatient if urinary frequency is persistent after above causes are treated  - would recommend stopping flomax as patient is not having difficulty urinating   - rest of care as per primary team   - discussed with  Attending Dr. Gillespie - no emergent urologic intervention indicated at this time   - would measure urine output   - please reconsult if patient is still having increased urinary frequency once SIADH vs. psychogenic polydipsia is worked up and treated   - patient should follow up with urology outpatient if urinary frequency is persistent after above causes are treated  - would recommend stopping flomax as patient is not having difficulty urinating   - rest of care as per primary team   - discussed with  Attending Dr. Gillespie

## 2023-12-18 NOTE — DISCHARGE NOTE PROVIDER - NSDCCPCAREPLAN_GEN_ALL_CORE_FT
PRINCIPAL DISCHARGE DIAGNOSIS  Diagnosis: Hyponatremia  Assessment and Plan of Treatment: You were admitted with a diagnosis of hyponatremia. Hyponatremia occurs when the concentration of sodium in your blood is abnormally low. Sodium is an electrolyte, and it helps regulate the amount of water that's in and around your cells. In hyponatremia, one or more factors — ranging from an underlying medical condition to drinking too much water — cause the sodium in your body to become diluted. When this happens, your body's water levels rise, and your cells begin to swell. This swelling can cause many health problems, from mild to life-threatening.  Hyponatremia treatment is aimed at resolving the underlying condition. Depending on the cause of hyponatremia, you may simply need to cut back on how much you drink. In other cases of hyponatremia, you may need intravenous electrolyte solutions and medications. In the hospital, you were treated with IV fluid formulations and fluid intake restriction. You were also given salt tablets to take, which you will continue taking as an outpatient until your follow up with urology.  PLEASE RETURN TO THE HOSPITAL IMMEDIATELY IF: You feel lightheaded, have palpitations, experience chest pain, or have a loss of consciousness.      SECONDARY DISCHARGE DIAGNOSES  Diagnosis: Overactive bladder  Assessment and Plan of Treatment: You have a diagnosis of overactive bladder. Overactive bladder, also called OAB, causes a frequent and sudden urge to urinate that may be difficult to control. You may feel like you need to pass urine many times during the day and night, and may also experience unintentional loss of urine (urgency incontinence).  If you have an overactive bladder, you may feel embarrassed, isolate yourself, or limit your work and social life. The good news is that a brief evaluation can determine whether there's a specific cause for your overactive bladder symptoms.  You may be able to manage symptoms of an overactive bladder with simple behavioral strategies, such as dietary changes, timed voiding and bladder-holding techniques using your pelvic floor muscles. If these initial efforts don't help enough with your overactive bladder symptoms, additional treatments are available.     PRINCIPAL DISCHARGE DIAGNOSIS  Diagnosis: Hyponatremia  Assessment and Plan of Treatment: You were admitted with a diagnosis of hyponatremia. Hyponatremia occurs when the concentration of sodium in your blood is abnormally low. Sodium is an electrolyte, and it helps regulate the amount of water that's in and around your cells. In hyponatremia, one or more factors — ranging from an underlying medical condition to drinking too much water — cause the sodium in your body to become diluted. When this happens, your body's water levels rise, and your cells begin to swell. This swelling can cause many health problems, from mild to life-threatening.  Hyponatremia treatment is aimed at resolving the underlying condition. Depending on the cause of hyponatremia, you may simply need to cut back on how much you drink. In other cases of hyponatremia, you may need intravenous electrolyte solutions and medications. In the hospital, you were treated with IV fluid formulations and fluid intake restriction. You were also given salt tablets to take.   **Continue taking the salt tablets three times per day.  **Talk to your primary care doctor before restarting your medication for anxiety or talk to them about an alternative option.  Of note: please let your doctor know that your sodium level was 128 when you left the hospital.      SECONDARY DISCHARGE DIAGNOSES  Diagnosis: Overactive bladder  Assessment and Plan of Treatment: You have a diagnosis of overactive bladder. Overactive bladder, also called OAB, causes a frequent and sudden urge to urinate that may be difficult to control. You may feel like you need to pass urine many times during the day and night, and may also experience unintentional loss of urine (urgency incontinence).  If you have an overactive bladder, you may feel embarrassed, isolate yourself, or limit your work and social life. The good news is that a brief evaluation can determine whether there's a specific cause for your overactive bladder symptoms.  You may be able to manage symptoms of an overactive bladder with simple behavioral strategies, such as dietary changes, timed voiding and bladder-holding techniques using your pelvic floor muscles. If these initial efforts don't help enough with your overactive bladder symptoms, additional treatments are available.  You were seen by the Urology team who recommended to NOT take Flomax medication.  **Please follow up with a Urologist for further management outside the hospital.

## 2023-12-18 NOTE — PROGRESS NOTE ADULT - PROBLEM SELECTOR PLAN 1
Possibly 2/2 SIADH vs psychogenic polydipsia   Patient started on fluoxitine 20 mg 12/6 making SIADH due to SSRI more likely  Na 124, Serum osm 264, Urine Na 70, Urine osm 481    - fluid restrict to 1.5 L  - start salt tab 1 g TID  - goal Na 132 by 9 AM 12/18  - recheck BMP q 6-8h  - hold fluoxitine for now, consider psych consult for lower dosing/med change  - Monitor for withdrawal symptoms- per UptoDate fluoxitine has low risk for SSRI withdrawal Possibly 2/2 SIADH vs psychogenic polydipsia   Patient started on fluoxitine 20 mg 12/6 making SIADH due to SSRI more likely  Na 124, Serum osm 264, Urine Na 70, Urine osm 481    - fluid restrict to 1.5 L  - start salt tab 1 g TID  - recheck BMP q 6-8h  - hold fluoxitine for now, consider psych consult for lower dosing/med change  - Monitor for withdrawal symptoms- per UptoDate fluoxitine has low risk for SSRI withdrawal  - Start UreNa

## 2023-12-18 NOTE — DISCHARGE NOTE PROVIDER - NSDCCPTREATMENT_GEN_ALL_CORE_FT
PRINCIPAL PROCEDURE  Procedure: Abdominal x-ray, AP  Findings and Treatment: INTERPRETATION: Clinical history/reason for exam: Hyponatremia.  KUB.  No comparison.  Findings/  impression: No abnormal bowel dilatation or pneumoperitoneum.

## 2023-12-18 NOTE — PROGRESS NOTE ADULT - ASSESSMENT
The patient is a 63 M with PMH of HIV (cd4 180s VL undetect), HTN, HLD, and anxiety presents with chest pressure, palpitations, and nocturia since the end of September with increased frequency of urination over the past ~ week, found to have hyponatremia, and admitted to medicine for further workup.

## 2023-12-18 NOTE — PROGRESS NOTE ADULT - ATTENDING COMMENTS
Pt is 64 yo man with anxiety, depression, recently started on SSRI, admitted with complains of urinary frequency mostly at night time. Pt had outpatient w/up with normal PSA levels noted. Patient is seen by Urology and recommended for outpatient w/up and stopping Flomax.   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 No neurologic signs of hyponatremia, Na values are on the rise.

## 2023-12-18 NOTE — CONSULT NOTE ADULT - ASSESSMENT
63 M with PMH of HIV (cd4 180s VL undetect), HTN, HLD, and anxiety presents with chest pressure, palpitations, and nocturia (every ~1h) since the end of September with increased frequency of urination (to every 30 min) over the past ~ week. Patient complaints of difficulty sleeping due to waking up to urinate.   Urology consulted for increased urinary frequency.  63 M with PMH of HIV (cd4 180s VL undetect), HTN, HLD, and anxiety presents with chest pressure, palpitations, and nocturia (every ~1h) since the end of September with increased frequency of urination (to every 30 min) over the past ~ week. Patient complaints of difficulty sleeping due to waking up to urinate.     Urology consulted for increased urinary frequency. UA negative, creatinine 0.75.

## 2023-12-18 NOTE — DISCHARGE NOTE PROVIDER - HOSPITAL COURSE
#Discharge: do not delete    Patient is __ yo M/F with past medical history of _____ presented with _____, found to have _____ (one liner)    Hospital course (by problem):     Patient was discharged to: (home/[facility]/acute rehab/hospice, etc, and with what services – home health PT/RN? Home O2?)    New medications:   Changes to old medications:  Medications that were stopped:    Items to follow up as outpatient:    Physical exam at the time of discharge:       #Discharge: do not delete    The patient is a 63 M with PMH of HIV (cd4 180s VL undetect), HTN, HLD, and anxiety presents with chest pressure, palpitations, and nocturia since the end of September with increased frequency of urination over the past ~ week, found to have hyponatremia, and admitted to medicine for further workup.    Hospital course (by problem):   #Hyponatremia.   Possibly 2/2 SIADH vs psychogenic polydipsia   Patient started on fluoxitine 20 mg 12/6 making SIADH due to SSRI more likely  Na 124, Serum osm 264, Urine Na 70, Urine osm 481  Plan:  - fluid restrict to 1.5 L  - start salt tab 1 g TID  - hold fluoxitine for now, consider psych consult for lower dosing/med change  - Start UreNa.    #Overactive bladder.   Patient reports urge to urinate frequently overnight causing lost sleep since September 2023. Denies pain in suprapubic area to palpation. No burning, straining/ hesitancy, no issues with bowel movements, fevers/chills. Consider bladder spams vs BPH, Denies hx of DM, thyroid disorder, trauma/injury; UA ketone 40, A1c 5.5  Plan:  - F/u Urology recs   - I&Os to determine true UOP  - post void BS    #Anxiety disorder.   Home medication: fluoxitine 20 mg  Plan:  - hold home medication iso hyponatremia  - Monitor for withdrawal symptoms- per [employer] fluoxitine has low risk for SSRI withdrawal.    #HIV disease.   Home medications: Edurant 25 mg qd, tivicay 50 mg qd, bactrim 1 DS tab 3 days a week for CD4 <200  Plan:  - c/w home medications  - no signs of sepsis/infection at this time, hold off on checking CD4.    #HTN (hypertension).   Home medication: amlodipine 5 mg qd  Plan:  - c/w home medication.    #HLD (hyperlipidemia).   Home medication: atorvastatin 20 mg qd  Plan:  - c/w home medication.    #Asthma.   Home medication: Anoro Ellipta 62.5-25  Plan:  - have partner bring medication so patient can resume in hospital.    Patient was discharged to: Home    New medications:   Changes to old medications: None  Medications that were stopped: None    Items to follow up as outpatient: F/u with urology as outpatient     Physical exam at the time of discharge:       #Discharge: do not delete    The patient is a 63 M with PMH of HIV (cd4 180s VL undetect), HTN, HLD, and anxiety presents with chest pressure, palpitations, and nocturia since the end of September with increased frequency of urination over the past ~ week, found to have hyponatremia, and admitted to medicine for further workup.    Hospital course (by problem):   #Hyponatremia.   Possibly 2/2 SIADH vs psychogenic polydipsia   Patient started on fluoxitine 20 mg 12/6 making SIADH due to SSRI more likely  Na 124, Serum osm 264, Urine Na 70, Urine osm 481  Plan:  - fluid restrict to 1.5 L  - start salt tab 1 g TID  - hold fluoxitine for now    #Overactive bladder.   Patient reports urge to urinate frequently overnight causing lost sleep since September 2023. Denies pain in suprapubic area to palpation. No burning, straining/ hesitancy, no issues with bowel movements, fevers/chills. Consider bladder spams vs BPH, Denies hx of DM, thyroid disorder, trauma/injury; UA ketone 40, A1c 5.5  Plan:  - Urology recs-->stop flomax     #Anxiety disorder.   Home medication: fluoxitine 20 mg  Plan:  - hold home medication iso hyponatremia  - f/u PCP outpatient    #HIV disease.   Home medications: Edurant 25 mg qd, tivicay 50 mg qd, bactrim 1 DS tab 3 days a week for CD4 <200  Plan:  - c/w home medications  - no signs of sepsis/infection at this time, hold off on checking CD4.    #HTN (hypertension).   Home medication: amlodipine 5 mg qd  Plan:  - c/w home medication.    #HLD (hyperlipidemia).   Home medication: atorvastatin 20 mg qd  Plan:  - c/w home medication.    #Asthma.   Home medication: Anoro Ellipta 62.5-25  Plan:  - have partner bring medication so patient can resume in hospital.    Patient was discharged to: Home    New medications: salt tablets  Changes to old medications: None  Medications that were stopped: None    Items to follow up as outpatient: F/u with urology, PCP as outpatient

## 2023-12-18 NOTE — PROGRESS NOTE ADULT - PROBLEM SELECTOR PLAN 8
Fluids: restrict 1.5 L  Electrolytes: replete K<4 and Mg<2  Diet: Dash  DVT ppx: Lovenox SQ  Activity: Ambulate as tolerated  Code: Full

## 2023-12-18 NOTE — CONSULT NOTE ADULT - SUBJECTIVE AND OBJECTIVE BOX
Patient is a 63y old  Male who presents with a chief complaint of Hyponatremia (18 Dec 2023 06:14)  HPI:  HPI: The patient is a 63 M with PMH of HIV (cd4 180s VL undetect), HTN, HLD, and anxiety presents with chest pressure, palpitations, and nocturia (every ~1h) since the end of September with increased frequency of urination (to every 30 min) over the past ~ week. Patient complaints of difficulty sleeping due to waking up to urinate. Patient denies burning with urination or straining to make urine. However states that after the first 2-3 awakening + voiding episodes he still feels the urge to urinate even though he feels his bladder is empty,  Patient was started on prozac 12/6 by his PCP Dr. Pierce for the anxiety and patient reports increased frequency of urination during the day since. He states that nothing provokes the panic/anxiety. Deep breathing and going for walks outdoors can provide some temporary relief. When he has an episode he endorses feeling restless, palpitations, and tightness in his chest.   Denies fever, chest pain, shortness of breath, abdominal pain, constipation, diarrhea, blurry/changes in vision  Endorses head pressures, chest pressure, and frequent urination    : Ad stated above. Urology consulted for increased urinary frequency. Patient states symptoms began in September and have gradually wosrened. He stated he experiences nocturia   In the ED:  Initial vital signs: T: 97.5 F, HR: 84, BP: 154/78, R: 18, SpO2: 100% on RA  ED course:   Labs: significant for Na 124, Cl 88, serum Osm 264, urine ketone 40, pending urine Na and osm  CXR: no infiltrate  EKG: NSR  Medications: ALPRAZolam 0.5 milliGRAM(s) Oral Once    Consults: none        (17 Dec 2023 11:06)      Vital Signs Last 24 Hrs  T(C): 36.4 (18 Dec 2023 09:44), Max: 37.2 (17 Dec 2023 12:00)  T(F): 97.6 (18 Dec 2023 09:44), Max: 99 (17 Dec 2023 12:00)  HR: 76 (18 Dec 2023 09:44) (69 - 78)  BP: 127/69 (18 Dec 2023 09:44) (116/69 - 156/89)  BP(mean): 98 (17 Dec 2023 15:25) (93 - 98)  RR: 17 (18 Dec 2023 09:44) (16 - 18)  SpO2: 99% (18 Dec 2023 09:44) (97% - 99%)    Parameters below as of 18 Dec 2023 09:44  Patient On (Oxygen Delivery Method): room air      I&O's Summary      PE:  Gen:  Abd:  :  JUAN C:    LABS:                        13.5   4.15  )-----------( 185      ( 18 Dec 2023 05:30 )             39.4     12-18    125<L>  |  91<L>  |  18  ----------------------------<  94  4.2   |  22  |  0.75    Ca    9.0      18 Dec 2023 05:30  Phos  3.5     12-18  Mg     1.8     12-18        Cultures      A/P Patient is a 63y old  Male who presents with a chief complaint of Hyponatremia (18 Dec 2023 06:14)  HPI:  HPI: The patient is a 63 M with PMH of HIV (cd4 180s VL undetect), HTN, HLD, and anxiety presents with chest pressure, palpitations, and nocturia (every ~1h) since the end of September with increased frequency of urination (to every 30 min) over the past ~ week. Patient complaints of difficulty sleeping due to waking up to urinate. Patient denies burning with urination or straining to make urine. However states that after the first 2-3 awakening + voiding episodes he still feels the urge to urinate even though he feels his bladder is empty,  Patient was started on prozac 12/6 by his PCP Dr. Pierce for the anxiety and patient reports increased frequency of urination during the day since. He states that nothing provokes the panic/anxiety. Deep breathing and going for walks outdoors can provide some temporary relief. When he has an episode he endorses feeling restless, palpitations, and tightness in his chest.   Denies fever, chest pain, shortness of breath, abdominal pain, constipation, diarrhea, blurry/changes in vision  Endorses head pressures, chest pressure, and frequent urination    : As stated above. Urology consulted for increased urinary frequency. Patient states symptoms began in September and have gradually worsened. He stated he experiences nocturia every hour of the evening. He states the first couple times he gets out of bed he urinates a small amount. After the first two episodes of nocturia he then still has the urge to urinate but little to no urine comes out. He states he also voids during the day every hour to half hour. He voiced his increased urinary habits and anxiety to his PCP who prescribed him Prozac on 12/6. He states after telling PCP symptoms of palpitations and increased urination has no changed since starting Prozac he was advised to present to ED for further workup. He denies any specific foods or beverages affect his urination. Denies any new medications besides Prozac recently. He denies dysuria, hematuria. Denies previous surgeries on kidney, ureter or bladder. Denies history of kidney stones. He states only surgery was an inguinal hernia repair 2 years ago. PSA 9/05/2023 was 0.2.     bladder scan 104cc    In the ED:  Initial vital signs: T: 97.5 F, HR: 84, BP: 154/78, R: 18, SpO2: 100% on RA  ED course:   Labs: significant for Na 124, Cl 88, serum Osm 264, urine ketone 40, pending urine Na and osm  CXR: no infiltrate  EKG: NSR  Medications: ALPRAZolam 0.5 milliGRAM(s) Oral Once    Consults: none        (17 Dec 2023 11:06)      Vital Signs Last 24 Hrs  T(C): 36.4 (18 Dec 2023 09:44), Max: 37.2 (17 Dec 2023 12:00)  T(F): 97.6 (18 Dec 2023 09:44), Max: 99 (17 Dec 2023 12:00)  HR: 76 (18 Dec 2023 09:44) (69 - 78)  BP: 127/69 (18 Dec 2023 09:44) (116/69 - 156/89)  BP(mean): 98 (17 Dec 2023 15:25) (93 - 98)  RR: 17 (18 Dec 2023 09:44) (16 - 18)  SpO2: 99% (18 Dec 2023 09:44) (97% - 99%)    Parameters below as of 18 Dec 2023 09:44  Patient On (Oxygen Delivery Method): room air      I&O's Summary      PE:  Gen: alert and oriented. no acute distress   Abd: soft, non-tender, non-distended   : no suprapubic tenderness to palpation. no right CVAT. no left CVAT     LABS:                        13.5   4.15  )-----------( 185      ( 18 Dec 2023 05:30 )             39.4     12-18    125<L>  |  91<L>  |  18  ----------------------------<  94  4.2   |  22  |  0.75    Ca    9.0      18 Dec 2023 05:30  Phos  3.5     12-18  Mg     1.8     12-18         Patient is a 63y old  Male who presents with a chief complaint of Hyponatremia (18 Dec 2023 06:14)  HPI:  HPI: The patient is a 63 M with PMH of HIV (cd4 180s VL undetect), HTN, HLD, and anxiety presents with chest pressure, palpitations, and nocturia (every ~1h) since the end of September with increased frequency of urination (to every 30 min) over the past ~ week. Patient complaints of difficulty sleeping due to waking up to urinate. Patient denies burning with urination or straining to make urine. However states that after the first 2-3 awakening + voiding episodes he still feels the urge to urinate even though he feels his bladder is empty,  Patient was started on prozac 12/6 by his PCP Dr. Pierce for the anxiety and patient reports increased frequency of urination during the day since. He states that nothing provokes the panic/anxiety. Deep breathing and going for walks outdoors can provide some temporary relief. When he has an episode he endorses feeling restless, palpitations, and tightness in his chest.   Denies fever, chest pain, shortness of breath, abdominal pain, constipation, diarrhea, blurry/changes in vision  Endorses head pressures, chest pressure, and frequent urination    : As stated above. Urology consulted for increased urinary frequency. Patient states symptoms began in September and have gradually worsened. He stated he experiences nocturia every hour of the evening. He states the first couple times he gets out of bed he urinates a small amount. After the first two episodes of nocturia he then still has the urge to urinate but little to no urine comes out. He states he also voids during the day every hour to half hour. He voiced his increased urinary habits and anxiety to his PCP who prescribed him Prozac on 12/6. He states after telling PCP symptoms of palpitations and increased urination has no changed since starting Prozac he was advised to present to ED for further workup. He denies any specific foods or beverages affect his urination. States prior to September he would have approximately 2episode of nocturia daily. Denies any new medications besides Prozac recently. He denies dysuria, hematuria. Denies previous surgeries on kidney, ureter or bladder. Denies history of kidney stones. He states only surgery was an inguinal hernia repair 2 years ago. PSA 9/05/2023 was 0.2.     bladder scan 104cc    In the ED:  Initial vital signs: T: 97.5 F, HR: 84, BP: 154/78, R: 18, SpO2: 100% on RA  ED course:   Labs: significant for Na 124, Cl 88, serum Osm 264, urine ketone 40, pending urine Na and osm  CXR: no infiltrate  EKG: NSR  Medications: ALPRAZolam 0.5 milliGRAM(s) Oral Once    Consults: none        (17 Dec 2023 11:06)      Vital Signs Last 24 Hrs  T(C): 36.4 (18 Dec 2023 09:44), Max: 37.2 (17 Dec 2023 12:00)  T(F): 97.6 (18 Dec 2023 09:44), Max: 99 (17 Dec 2023 12:00)  HR: 76 (18 Dec 2023 09:44) (69 - 78)  BP: 127/69 (18 Dec 2023 09:44) (116/69 - 156/89)  BP(mean): 98 (17 Dec 2023 15:25) (93 - 98)  RR: 17 (18 Dec 2023 09:44) (16 - 18)  SpO2: 99% (18 Dec 2023 09:44) (97% - 99%)    Parameters below as of 18 Dec 2023 09:44  Patient On (Oxygen Delivery Method): room air      I&O's Summary      PE:  Gen: alert and oriented. no acute distress   Abd: soft, non-tender, non-distended   : no suprapubic tenderness to palpation. no right CVAT. no left CVAT     LABS:                        13.5   4.15  )-----------( 185      ( 18 Dec 2023 05:30 )             39.4     12-18    125<L>  |  91<L>  |  18  ----------------------------<  94  4.2   |  22  |  0.75    Ca    9.0      18 Dec 2023 05:30  Phos  3.5     12-18  Mg     1.8     12-18

## 2023-12-18 NOTE — PROGRESS NOTE ADULT - PROBLEM SELECTOR PLAN 4
RAJNI
Home medications: Edurant 25 mg qd, tivicay 50 mg qd, bactrim 1 DS tab 3 days a week for CD4 <200    - c/w home medications  - no signs of sepsis/infection at this time, hold off on checking CD4

## 2023-12-18 NOTE — DISCHARGE NOTE NURSING/CASE MANAGEMENT/SOCIAL WORK - PATIENT PORTAL LINK FT
You can access the FollowMyHealth Patient Portal offered by [facility] by registering at the following website: [email or web address]. By joining [employer]’s FollowMyHealth portal, you will also be able to view your health information using other applications (apps) compatible with our system. You can access the FollowMyHealth Patient Portal offered by [facility] by registering at the following website: [email or web address]. By joining [employer]’s FollowMyHealth portal, you will also be able to view your health information using other applications (apps) compatible with our system.

## 2023-12-18 NOTE — DISCHARGE NOTE NURSING/CASE MANAGEMENT/SOCIAL WORK - NSDCPEFALRISK_GEN_ALL_CORE
For information on Fall & Injury Prevention, visit: [email or web address] OR  [email or web address] OR  https://www.cdc.gov/steadi/patient.html For information on Fall & Injury Prevention, visit: [email or web address] OR  [email or web address]/[email or web address] OR  https://www.cdc.gov/steadi/patient.html

## 2023-12-18 NOTE — PROGRESS NOTE ADULT - SUBJECTIVE AND OBJECTIVE BOX
OVERNIGHT EVENTS:    SUBJECTIVE / INTERVAL HPI: Patient seen and examined at bedside.     ROS: negative unless otherwise stated above.    VITAL SIGNS:  Vital Signs Last 24 Hrs  T(C): 36.6 (18 Dec 2023 05:26), Max: 37.2 (17 Dec 2023 12:00)  T(F): 97.8 (18 Dec 2023 05:26), Max: 99 (17 Dec 2023 12:00)  HR: 78 (18 Dec 2023 05:26) (69 - 84)  BP: 116/69 (18 Dec 2023 05:26) (116/69 - 156/89)  BP(mean): 98 (17 Dec 2023 15:25) (93 - 98)  RR: 18 (18 Dec 2023 05:26) (16 - 18)  SpO2: 97% (18 Dec 2023 05:26) (97% - 100%)    Parameters below as of 17 Dec 2023 21:00  Patient On (Oxygen Delivery Method): room air        PHYSICAL EXAM:    General: In no apparent distress  HEENT: NC/AT; PERRL, anicteric sclera; MMM  Neck: supple  Cardiovascular: +S1/S2; RRR  Respiratory: CTA B/L; no W/R/R  Gastrointestinal: soft, NT/ND; +BSx4  Extremities: WWP; no edema, clubbing or cyanosis  Vascular: 2+ radial, DP/PT pulses B/L  Neurological: AAOx3; no focal deficits    MEDICATIONS:  MEDICATIONS  (STANDING):  amLODIPine   Tablet 5 milliGRAM(s) Oral daily  atorvastatin 20 milliGRAM(s) Oral at bedtime  dolutegravir 50 milliGRAM(s) Oral daily  enoxaparin Injectable 40 milliGRAM(s) SubCutaneous every 24 hours  rilpivirine 25 milliGRAM(s) Oral daily  sodium chloride 1 Gram(s) Oral three times a day  tamsulosin 0.4 milliGRAM(s) Oral at bedtime  trimethoprim  160 mG/sulfamethoxazole 800 mG 1 Tablet(s) Oral <User Schedule>    MEDICATIONS  (PRN):  acetaminophen     Tablet .. 650 milliGRAM(s) Oral every 6 hours PRN Temp greater or equal to 38C (100.4F), Mild Pain (1 - 3)  albuterol/ipratropium for Nebulization 3 milliLiter(s) Nebulizer every 6 hours PRN Shortness of Breath and/or Wheezing  melatonin 3 milliGRAM(s) Oral at bedtime PRN Insomnia  ondansetron Injectable 4 milliGRAM(s) IV Push every 8 hours PRN Nausea and/or Vomiting      ALLERGIES:  Allergies    Milk (Unknown)  No Known Drug Allergies  shellfish (Unknown)  Seafood (Unknown)  Gluten (Unknown)  Sesame (Unknown)    Intolerances        LABS:                        15.8   4.80  )-----------( 218      ( 17 Dec 2023 09:11 )             45.6     12-17    121<L>  |  87<L>  |  20  ----------------------------<  93  4.2   |  26  |  1.10    Ca    9.2      17 Dec 2023 22:29        Urinalysis Basic - ( 17 Dec 2023 22:29 )    Color: x / Appearance: x / SG: x / pH: x  Gluc: 93 mg/dL / Ketone: x  / Bili: x / Urobili: x   Blood: x / Protein: x / Nitrite: x   Leuk Esterase: x / RBC: x / WBC x   Sq Epi: x / Non Sq Epi: x / Bacteria: x      CAPILLARY BLOOD GLUCOSE          RADIOLOGY & ADDITIONAL TESTS: Reviewed. OVERNIGHT EVENTS: WILFRIDO    SUBJECTIVE / INTERVAL HPI: Patient seen and examined at bedside. The patient was sitting up in bed, in no acute distress. He endorses continued feelings of urinary urgency without urine production, and also endorses insomnia since these symptoms began 2 months prior. He denies any symptoms of hyponatremia such as lightheadedness or dizziness. He also denies SOB, chest pain, palpitations, n/v/d. Last BM was 12/18 before coming to the hospital.     ROS: negative unless otherwise stated above.    VITAL SIGNS:  Vital Signs Last 24 Hrs  T(C): 36.6 (18 Dec 2023 05:26), Max: 37.2 (17 Dec 2023 12:00)  T(F): 97.8 (18 Dec 2023 05:26), Max: 99 (17 Dec 2023 12:00)  HR: 78 (18 Dec 2023 05:26) (69 - 84)  BP: 116/69 (18 Dec 2023 05:26) (116/69 - 156/89)  BP(mean): 98 (17 Dec 2023 15:25) (93 - 98)  RR: 18 (18 Dec 2023 05:26) (16 - 18)  SpO2: 97% (18 Dec 2023 05:26) (97% - 100%)    Parameters below as of 17 Dec 2023 21:00  Patient On (Oxygen Delivery Method): room air        PHYSICAL EXAM:    General: In no apparent distress  HEENT: NC/AT; PERRL, anicteric sclera; MMM  Neck: supple  Cardiovascular: +S1/S2; RRR  Respiratory: CTA B/L; no W/R/R  Gastrointestinal: soft, NT/ND; +BSx4  Extremities: WWP; no edema, clubbing or cyanosis  Vascular: 2+ radial, DP/PT pulses B/L  Neurological: AAOx3; no focal deficits    MEDICATIONS:  MEDICATIONS  (STANDING):  amLODIPine   Tablet 5 milliGRAM(s) Oral daily  atorvastatin 20 milliGRAM(s) Oral at bedtime  dolutegravir 50 milliGRAM(s) Oral daily  enoxaparin Injectable 40 milliGRAM(s) SubCutaneous every 24 hours  rilpivirine 25 milliGRAM(s) Oral daily  sodium chloride 1 Gram(s) Oral three times a day  tamsulosin 0.4 milliGRAM(s) Oral at bedtime  trimethoprim  160 mG/sulfamethoxazole 800 mG 1 Tablet(s) Oral <User Schedule>    MEDICATIONS  (PRN):  acetaminophen     Tablet .. 650 milliGRAM(s) Oral every 6 hours PRN Temp greater or equal to 38C (100.4F), Mild Pain (1 - 3)  albuterol/ipratropium for Nebulization 3 milliLiter(s) Nebulizer every 6 hours PRN Shortness of Breath and/or Wheezing  melatonin 3 milliGRAM(s) Oral at bedtime PRN Insomnia  ondansetron Injectable 4 milliGRAM(s) IV Push every 8 hours PRN Nausea and/or Vomiting      ALLERGIES:  Allergies    Milk (Unknown)  No Known Drug Allergies  shellfish (Unknown)  Seafood (Unknown)  Gluten (Unknown)  Sesame (Unknown)    Intolerances        LABS:                        15.8   4.80  )-----------( 218      ( 17 Dec 2023 09:11 )             45.6     12-17    121<L>  |  87<L>  |  20  ----------------------------<  93  4.2   |  26  |  1.10    Ca    9.2      17 Dec 2023 22:29        Urinalysis Basic - ( 17 Dec 2023 22:29 )    Color: x / Appearance: x / SG: x / pH: x  Gluc: 93 mg/dL / Ketone: x  / Bili: x / Urobili: x   Blood: x / Protein: x / Nitrite: x   Leuk Esterase: x / RBC: x / WBC x   Sq Epi: x / Non Sq Epi: x / Bacteria: x      CAPILLARY BLOOD GLUCOSE          RADIOLOGY & ADDITIONAL TESTS: Reviewed. OVERNIGHT EVENTS: WILFRIDO    SUBJECTIVE / INTERVAL HPI: Patient seen and examined at bedside. The patient was sitting up in bed, in no acute distress. He endorses continued feelings of urinary urgency without urine production, and also endorses insomnia since these symptoms began 2 months prior. He denies any symptoms of hyponatremia such as lightheadedness or dizziness. He also denies SOB, chest pain, palpitations, n/v/d. Last BM was 12/17 before coming to the hospital.     ROS: negative unless otherwise stated above.    VITAL SIGNS:  Vital Signs Last 24 Hrs  T(C): 36.6 (18 Dec 2023 05:26), Max: 37.2 (17 Dec 2023 12:00)  T(F): 97.8 (18 Dec 2023 05:26), Max: 99 (17 Dec 2023 12:00)  HR: 78 (18 Dec 2023 05:26) (69 - 84)  BP: 116/69 (18 Dec 2023 05:26) (116/69 - 156/89)  BP(mean): 98 (17 Dec 2023 15:25) (93 - 98)  RR: 18 (18 Dec 2023 05:26) (16 - 18)  SpO2: 97% (18 Dec 2023 05:26) (97% - 100%)    Parameters below as of 17 Dec 2023 21:00  Patient On (Oxygen Delivery Method): room air        PHYSICAL EXAM:    General: In no apparent distress  HEENT: NC/AT; PERRL, anicteric sclera; MMM  Neck: supple  Cardiovascular: +S1/S2; RRR  Respiratory: CTA B/L; no W/R/R  Gastrointestinal: soft, NT/ND; +BSx4  Extremities: WWP; no edema, clubbing or cyanosis  Vascular: 2+ radial, DP/PT pulses B/L  Neurological: AAOx3; no focal deficits    MEDICATIONS:  MEDICATIONS  (STANDING):  amLODIPine   Tablet 5 milliGRAM(s) Oral daily  atorvastatin 20 milliGRAM(s) Oral at bedtime  dolutegravir 50 milliGRAM(s) Oral daily  enoxaparin Injectable 40 milliGRAM(s) SubCutaneous every 24 hours  rilpivirine 25 milliGRAM(s) Oral daily  sodium chloride 1 Gram(s) Oral three times a day  tamsulosin 0.4 milliGRAM(s) Oral at bedtime  trimethoprim  160 mG/sulfamethoxazole 800 mG 1 Tablet(s) Oral <User Schedule>    MEDICATIONS  (PRN):  acetaminophen     Tablet .. 650 milliGRAM(s) Oral every 6 hours PRN Temp greater or equal to 38C (100.4F), Mild Pain (1 - 3)  albuterol/ipratropium for Nebulization 3 milliLiter(s) Nebulizer every 6 hours PRN Shortness of Breath and/or Wheezing  melatonin 3 milliGRAM(s) Oral at bedtime PRN Insomnia  ondansetron Injectable 4 milliGRAM(s) IV Push every 8 hours PRN Nausea and/or Vomiting      ALLERGIES:  Allergies    Milk (Unknown)  No Known Drug Allergies  shellfish (Unknown)  Seafood (Unknown)  Gluten (Unknown)  Sesame (Unknown)    Intolerances        LABS:                        15.8   4.80  )-----------( 218      ( 17 Dec 2023 09:11 )             45.6     12-17    121<L>  |  87<L>  |  20  ----------------------------<  93  4.2   |  26  |  1.10    Ca    9.2      17 Dec 2023 22:29        Urinalysis Basic - ( 17 Dec 2023 22:29 )    Color: x / Appearance: x / SG: x / pH: x  Gluc: 93 mg/dL / Ketone: x  / Bili: x / Urobili: x   Blood: x / Protein: x / Nitrite: x   Leuk Esterase: x / RBC: x / WBC x   Sq Epi: x / Non Sq Epi: x / Bacteria: x      CAPILLARY BLOOD GLUCOSE          RADIOLOGY & ADDITIONAL TESTS: Reviewed.

## 2023-12-18 NOTE — DISCHARGE NOTE PROVIDER - PROVIDER TOKENS
PROVIDER:[TOKEN:[[id number]],FOLLOWUP:[2 weeks]] PROVIDER:[TOKEN:[[id number]],FOLLOWUP:[2 weeks]]

## 2023-12-22 DIAGNOSIS — I10 ESSENTIAL (PRIMARY) HYPERTENSION: ICD-10-CM

## 2023-12-22 DIAGNOSIS — Z91.018 ALLERGY TO OTHER FOODS: ICD-10-CM

## 2023-12-22 DIAGNOSIS — F41.9 ANXIETY DISORDER, UNSPECIFIED: ICD-10-CM

## 2023-12-22 DIAGNOSIS — Z87.891 PERSONAL HISTORY OF NICOTINE DEPENDENCE: ICD-10-CM

## 2023-12-22 DIAGNOSIS — E87.8 OTHER DISORDERS OF ELECTROLYTE AND FLUID BALANCE, NOT ELSEWHERE CLASSIFIED: ICD-10-CM

## 2023-12-22 DIAGNOSIS — B20 HUMAN IMMUNODEFICIENCY VIRUS [HIV] DISEASE: ICD-10-CM

## 2023-12-22 DIAGNOSIS — E22.2 SYNDROME OF INAPPROPRIATE SECRETION OF ANTIDIURETIC HORMONE: ICD-10-CM

## 2023-12-22 DIAGNOSIS — E78.5 HYPERLIPIDEMIA, UNSPECIFIED: ICD-10-CM

## 2023-12-22 DIAGNOSIS — T43.225A ADVERSE EFFECT OF SELECTIVE SEROTONIN REUPTAKE INHIBITORS, INITIAL ENCOUNTER: ICD-10-CM

## 2023-12-22 DIAGNOSIS — J45.909 UNSPECIFIED ASTHMA, UNCOMPLICATED: ICD-10-CM

## 2023-12-22 DIAGNOSIS — Z91.011 ALLERGY TO MILK PRODUCTS: ICD-10-CM

## 2023-12-22 DIAGNOSIS — Z91.013 ALLERGY TO SEAFOOD: ICD-10-CM

## 2023-12-22 DIAGNOSIS — N32.81 OVERACTIVE BLADDER: ICD-10-CM

## 2023-12-22 DIAGNOSIS — E87.1 HYPO-OSMOLALITY AND HYPONATREMIA: ICD-10-CM

## 2023-12-22 DIAGNOSIS — R63.1 POLYDIPSIA: ICD-10-CM

## 2023-12-23 ENCOUNTER — INPATIENT (INPATIENT)
Facility: HOSPITAL | Age: 63
LOS: 1 days | Discharge: HOME | DRG: 644 | End: 2023-12-25
Attending: INTERNAL MEDICINE | Admitting: INTERNAL MEDICINE
Payer: COMMERCIAL

## 2023-12-23 VITALS
DIASTOLIC BLOOD PRESSURE: 82 MMHG | OXYGEN SATURATION: 98 % | RESPIRATION RATE: 18 BRPM | TEMPERATURE: 98 F | WEIGHT: 98.11 LBS | HEART RATE: 85 BPM | SYSTOLIC BLOOD PRESSURE: 168 MMHG

## 2023-12-23 DIAGNOSIS — E78.5 HYPERLIPIDEMIA, UNSPECIFIED: ICD-10-CM

## 2023-12-23 DIAGNOSIS — E87.1 HYPO-OSMOLALITY AND HYPONATREMIA: ICD-10-CM

## 2023-12-23 DIAGNOSIS — N32.81 OVERACTIVE BLADDER: ICD-10-CM

## 2023-12-23 DIAGNOSIS — F41.9 ANXIETY DISORDER, UNSPECIFIED: ICD-10-CM

## 2023-12-23 DIAGNOSIS — J45.909 UNSPECIFIED ASTHMA, UNCOMPLICATED: ICD-10-CM

## 2023-12-23 DIAGNOSIS — B20 HUMAN IMMUNODEFICIENCY VIRUS [HIV] DISEASE: ICD-10-CM

## 2023-12-23 DIAGNOSIS — Z98.890 OTHER SPECIFIED POSTPROCEDURAL STATES: Chronic | ICD-10-CM

## 2023-12-23 DIAGNOSIS — I10 ESSENTIAL (PRIMARY) HYPERTENSION: ICD-10-CM

## 2023-12-23 LAB
ANION GAP SERPL CALC-SCNC: 10 MMOL/L — SIGNIFICANT CHANGE UP (ref 5–17)
ANION GAP SERPL CALC-SCNC: 10 MMOL/L — SIGNIFICANT CHANGE UP (ref 5–17)
ANION GAP SERPL CALC-SCNC: 12 MMOL/L — SIGNIFICANT CHANGE UP (ref 5–17)
ANION GAP SERPL CALC-SCNC: 12 MMOL/L — SIGNIFICANT CHANGE UP (ref 5–17)
ANION GAP SERPL CALC-SCNC: 9 MMOL/L — SIGNIFICANT CHANGE UP (ref 5–17)
ANION GAP SERPL CALC-SCNC: 9 MMOL/L — SIGNIFICANT CHANGE UP (ref 5–17)
APPEARANCE UR: CLEAR — SIGNIFICANT CHANGE UP
APPEARANCE UR: CLEAR — SIGNIFICANT CHANGE UP
BASOPHILS # BLD AUTO: 0.07 K/UL — SIGNIFICANT CHANGE UP (ref 0–0.2)
BASOPHILS # BLD AUTO: 0.07 K/UL — SIGNIFICANT CHANGE UP (ref 0–0.2)
BASOPHILS NFR BLD AUTO: 1.1 % — SIGNIFICANT CHANGE UP (ref 0–2)
BASOPHILS NFR BLD AUTO: 1.1 % — SIGNIFICANT CHANGE UP (ref 0–2)
BILIRUB UR-MCNC: NEGATIVE — SIGNIFICANT CHANGE UP
BILIRUB UR-MCNC: NEGATIVE — SIGNIFICANT CHANGE UP
BUN SERPL-MCNC: 7 MG/DL — SIGNIFICANT CHANGE UP (ref 7–23)
CALCIUM SERPL-MCNC: 8.7 MG/DL — SIGNIFICANT CHANGE UP (ref 8.4–10.5)
CALCIUM SERPL-MCNC: 8.7 MG/DL — SIGNIFICANT CHANGE UP (ref 8.4–10.5)
CALCIUM SERPL-MCNC: 9.5 MG/DL — SIGNIFICANT CHANGE UP (ref 8.4–10.5)
CHLORIDE SERPL-SCNC: 85 MMOL/L — LOW (ref 96–108)
CHLORIDE SERPL-SCNC: 86 MMOL/L — LOW (ref 96–108)
CHLORIDE SERPL-SCNC: 86 MMOL/L — LOW (ref 96–108)
CO2 SERPL-SCNC: 23 MMOL/L — SIGNIFICANT CHANGE UP (ref 22–31)
CO2 SERPL-SCNC: 23 MMOL/L — SIGNIFICANT CHANGE UP (ref 22–31)
CO2 SERPL-SCNC: 24 MMOL/L — SIGNIFICANT CHANGE UP (ref 22–31)
CO2 SERPL-SCNC: 24 MMOL/L — SIGNIFICANT CHANGE UP (ref 22–31)
CO2 SERPL-SCNC: 25 MMOL/L — SIGNIFICANT CHANGE UP (ref 22–31)
CO2 SERPL-SCNC: 25 MMOL/L — SIGNIFICANT CHANGE UP (ref 22–31)
COLOR SPEC: YELLOW — SIGNIFICANT CHANGE UP
COLOR SPEC: YELLOW — SIGNIFICANT CHANGE UP
CREAT SERPL-MCNC: 0.64 MG/DL — SIGNIFICANT CHANGE UP (ref 0.5–1.3)
CREAT SERPL-MCNC: 0.64 MG/DL — SIGNIFICANT CHANGE UP (ref 0.5–1.3)
CREAT SERPL-MCNC: 0.66 MG/DL — SIGNIFICANT CHANGE UP (ref 0.5–1.3)
CREAT SERPL-MCNC: 0.66 MG/DL — SIGNIFICANT CHANGE UP (ref 0.5–1.3)
CREAT SERPL-MCNC: 0.72 MG/DL — SIGNIFICANT CHANGE UP (ref 0.5–1.3)
CREAT SERPL-MCNC: 0.72 MG/DL — SIGNIFICANT CHANGE UP (ref 0.5–1.3)
DIFF PNL FLD: NEGATIVE — SIGNIFICANT CHANGE UP
DIFF PNL FLD: NEGATIVE — SIGNIFICANT CHANGE UP
EGFR: 103 ML/MIN/1.73M2 — SIGNIFICANT CHANGE UP
EGFR: 103 ML/MIN/1.73M2 — SIGNIFICANT CHANGE UP
EGFR: 105 ML/MIN/1.73M2 — SIGNIFICANT CHANGE UP
EGFR: 105 ML/MIN/1.73M2 — SIGNIFICANT CHANGE UP
EGFR: 106 ML/MIN/1.73M2 — SIGNIFICANT CHANGE UP
EGFR: 106 ML/MIN/1.73M2 — SIGNIFICANT CHANGE UP
EOSINOPHIL # BLD AUTO: 0.08 K/UL — SIGNIFICANT CHANGE UP (ref 0–0.5)
EOSINOPHIL # BLD AUTO: 0.08 K/UL — SIGNIFICANT CHANGE UP (ref 0–0.5)
EOSINOPHIL NFR BLD AUTO: 1.3 % — SIGNIFICANT CHANGE UP (ref 0–6)
EOSINOPHIL NFR BLD AUTO: 1.3 % — SIGNIFICANT CHANGE UP (ref 0–6)
GLUCOSE SERPL-MCNC: 109 MG/DL — HIGH (ref 70–99)
GLUCOSE SERPL-MCNC: 109 MG/DL — HIGH (ref 70–99)
GLUCOSE SERPL-MCNC: 119 MG/DL — HIGH (ref 70–99)
GLUCOSE SERPL-MCNC: 119 MG/DL — HIGH (ref 70–99)
GLUCOSE SERPL-MCNC: 125 MG/DL — HIGH (ref 70–99)
GLUCOSE SERPL-MCNC: 125 MG/DL — HIGH (ref 70–99)
GLUCOSE UR QL: NEGATIVE MG/DL — SIGNIFICANT CHANGE UP
GLUCOSE UR QL: NEGATIVE MG/DL — SIGNIFICANT CHANGE UP
HCT VFR BLD CALC: 40.7 % — SIGNIFICANT CHANGE UP (ref 39–50)
HCT VFR BLD CALC: 40.7 % — SIGNIFICANT CHANGE UP (ref 39–50)
HGB BLD-MCNC: 14.6 G/DL — SIGNIFICANT CHANGE UP (ref 13–17)
HGB BLD-MCNC: 14.6 G/DL — SIGNIFICANT CHANGE UP (ref 13–17)
IMM GRANULOCYTES NFR BLD AUTO: 0.5 % — SIGNIFICANT CHANGE UP (ref 0–0.9)
IMM GRANULOCYTES NFR BLD AUTO: 0.5 % — SIGNIFICANT CHANGE UP (ref 0–0.9)
KETONES UR-MCNC: ABNORMAL MG/DL
KETONES UR-MCNC: ABNORMAL MG/DL
LEUKOCYTE ESTERASE UR-ACNC: NEGATIVE — SIGNIFICANT CHANGE UP
LEUKOCYTE ESTERASE UR-ACNC: NEGATIVE — SIGNIFICANT CHANGE UP
LYMPHOCYTES # BLD AUTO: 0.88 K/UL — LOW (ref 1–3.3)
LYMPHOCYTES # BLD AUTO: 0.88 K/UL — LOW (ref 1–3.3)
LYMPHOCYTES # BLD AUTO: 14.2 % — SIGNIFICANT CHANGE UP (ref 13–44)
LYMPHOCYTES # BLD AUTO: 14.2 % — SIGNIFICANT CHANGE UP (ref 13–44)
MCHC RBC-ENTMCNC: 28.2 PG — SIGNIFICANT CHANGE UP (ref 27–34)
MCHC RBC-ENTMCNC: 28.2 PG — SIGNIFICANT CHANGE UP (ref 27–34)
MCHC RBC-ENTMCNC: 35.9 GM/DL — SIGNIFICANT CHANGE UP (ref 32–36)
MCHC RBC-ENTMCNC: 35.9 GM/DL — SIGNIFICANT CHANGE UP (ref 32–36)
MCV RBC AUTO: 78.6 FL — LOW (ref 80–100)
MCV RBC AUTO: 78.6 FL — LOW (ref 80–100)
MONOCYTES # BLD AUTO: 0.66 K/UL — SIGNIFICANT CHANGE UP (ref 0–0.9)
MONOCYTES # BLD AUTO: 0.66 K/UL — SIGNIFICANT CHANGE UP (ref 0–0.9)
MONOCYTES NFR BLD AUTO: 10.7 % — SIGNIFICANT CHANGE UP (ref 2–14)
MONOCYTES NFR BLD AUTO: 10.7 % — SIGNIFICANT CHANGE UP (ref 2–14)
NEUTROPHILS # BLD AUTO: 4.46 K/UL — SIGNIFICANT CHANGE UP (ref 1.8–7.4)
NEUTROPHILS # BLD AUTO: 4.46 K/UL — SIGNIFICANT CHANGE UP (ref 1.8–7.4)
NEUTROPHILS NFR BLD AUTO: 72.2 % — SIGNIFICANT CHANGE UP (ref 43–77)
NEUTROPHILS NFR BLD AUTO: 72.2 % — SIGNIFICANT CHANGE UP (ref 43–77)
NITRITE UR-MCNC: NEGATIVE — SIGNIFICANT CHANGE UP
NITRITE UR-MCNC: NEGATIVE — SIGNIFICANT CHANGE UP
NRBC # BLD: 0 /100 WBCS — SIGNIFICANT CHANGE UP (ref 0–0)
NRBC # BLD: 0 /100 WBCS — SIGNIFICANT CHANGE UP (ref 0–0)
OSMOLALITY SERPL: 250 MOSM/KG — LOW (ref 280–301)
OSMOLALITY SERPL: 250 MOSM/KG — LOW (ref 280–301)
PH UR: 8.5 (ref 5–8)
PH UR: 8.5 (ref 5–8)
PLATELET # BLD AUTO: 230 K/UL — SIGNIFICANT CHANGE UP (ref 150–400)
PLATELET # BLD AUTO: 230 K/UL — SIGNIFICANT CHANGE UP (ref 150–400)
POTASSIUM SERPL-MCNC: 3.7 MMOL/L — SIGNIFICANT CHANGE UP (ref 3.5–5.3)
POTASSIUM SERPL-MCNC: 3.7 MMOL/L — SIGNIFICANT CHANGE UP (ref 3.5–5.3)
POTASSIUM SERPL-MCNC: 4.2 MMOL/L — SIGNIFICANT CHANGE UP (ref 3.5–5.3)
POTASSIUM SERPL-SCNC: 3.7 MMOL/L — SIGNIFICANT CHANGE UP (ref 3.5–5.3)
POTASSIUM SERPL-SCNC: 3.7 MMOL/L — SIGNIFICANT CHANGE UP (ref 3.5–5.3)
POTASSIUM SERPL-SCNC: 4.2 MMOL/L — SIGNIFICANT CHANGE UP (ref 3.5–5.3)
PROT UR-MCNC: NEGATIVE MG/DL — SIGNIFICANT CHANGE UP
PROT UR-MCNC: NEGATIVE MG/DL — SIGNIFICANT CHANGE UP
RBC # BLD: 5.18 M/UL — SIGNIFICANT CHANGE UP (ref 4.2–5.8)
RBC # BLD: 5.18 M/UL — SIGNIFICANT CHANGE UP (ref 4.2–5.8)
RBC # FLD: 12 % — SIGNIFICANT CHANGE UP (ref 10.3–14.5)
RBC # FLD: 12 % — SIGNIFICANT CHANGE UP (ref 10.3–14.5)
SODIUM SERPL-SCNC: 117 MMOL/L — CRITICAL LOW (ref 135–145)
SODIUM SERPL-SCNC: 117 MMOL/L — CRITICAL LOW (ref 135–145)
SODIUM SERPL-SCNC: 119 MMOL/L — CRITICAL LOW (ref 135–145)
SODIUM SERPL-SCNC: 119 MMOL/L — CRITICAL LOW (ref 135–145)
SODIUM SERPL-SCNC: 120 MMOL/L — CRITICAL LOW (ref 135–145)
SODIUM UR-SCNC: 104 MMOL/L — SIGNIFICANT CHANGE UP
SODIUM UR-SCNC: 104 MMOL/L — SIGNIFICANT CHANGE UP
SP GR SPEC: 1.01 — SIGNIFICANT CHANGE UP (ref 1–1.03)
SP GR SPEC: 1.01 — SIGNIFICANT CHANGE UP (ref 1–1.03)
UROBILINOGEN FLD QL: 0.2 MG/DL — SIGNIFICANT CHANGE UP (ref 0.2–1)
UROBILINOGEN FLD QL: 0.2 MG/DL — SIGNIFICANT CHANGE UP (ref 0.2–1)
WBC # BLD: 6.18 K/UL — SIGNIFICANT CHANGE UP (ref 3.8–10.5)
WBC # BLD: 6.18 K/UL — SIGNIFICANT CHANGE UP (ref 3.8–10.5)
WBC # FLD AUTO: 6.18 K/UL — SIGNIFICANT CHANGE UP (ref 3.8–10.5)
WBC # FLD AUTO: 6.18 K/UL — SIGNIFICANT CHANGE UP (ref 3.8–10.5)

## 2023-12-23 PROCEDURE — 99222 1ST HOSP IP/OBS MODERATE 55: CPT

## 2023-12-23 PROCEDURE — 70450 CT HEAD/BRAIN W/O DYE: CPT | Mod: 26

## 2023-12-23 PROCEDURE — 99222 1ST HOSP IP/OBS MODERATE 55: CPT | Mod: GC

## 2023-12-23 PROCEDURE — 99285 EMERGENCY DEPT VISIT HI MDM: CPT

## 2023-12-23 RX ORDER — UMECLIDINIUM BROMIDE AND VILANTEROL TRIFENATATE 62.5; 25 UG/1; UG/1
1 POWDER RESPIRATORY (INHALATION)
Refills: 0 | DISCHARGE

## 2023-12-23 RX ORDER — ATORVASTATIN CALCIUM 80 MG/1
1 TABLET, FILM COATED ORAL
Refills: 0 | DISCHARGE

## 2023-12-23 RX ORDER — ACETAMINOPHEN 500 MG
650 TABLET ORAL EVERY 6 HOURS
Refills: 0 | Status: DISCONTINUED | OUTPATIENT
Start: 2023-12-23 | End: 2023-12-25

## 2023-12-23 RX ORDER — ATORVASTATIN CALCIUM 80 MG/1
20 TABLET, FILM COATED ORAL AT BEDTIME
Refills: 0 | Status: DISCONTINUED | OUTPATIENT
Start: 2023-12-23 | End: 2023-12-25

## 2023-12-23 RX ORDER — TAMSULOSIN HYDROCHLORIDE 0.4 MG/1
0.4 CAPSULE ORAL AT BEDTIME
Refills: 0 | Status: DISCONTINUED | OUTPATIENT
Start: 2023-12-23 | End: 2023-12-25

## 2023-12-23 RX ORDER — ALBUTEROL 90 UG/1
2 AEROSOL, METERED ORAL EVERY 6 HOURS
Refills: 0 | Status: DISCONTINUED | OUTPATIENT
Start: 2023-12-23 | End: 2023-12-25

## 2023-12-23 RX ORDER — ALBUTEROL 90 UG/1
2 AEROSOL, METERED ORAL
Refills: 0 | DISCHARGE

## 2023-12-23 RX ORDER — DOLUTEGRAVIR SODIUM 25 MG/1
1 TABLET, FILM COATED ORAL
Refills: 0 | DISCHARGE

## 2023-12-23 RX ORDER — RILPIVIRINE HYDROCHLORIDE 25 MG/1
25 TABLET, FILM COATED ORAL
Refills: 0 | Status: DISCONTINUED | OUTPATIENT
Start: 2023-12-23 | End: 2023-12-25

## 2023-12-23 RX ORDER — SODIUM CHLORIDE 5 G/100ML
100 INJECTION, SOLUTION INTRAVENOUS ONCE
Refills: 0 | Status: COMPLETED | OUTPATIENT
Start: 2023-12-23 | End: 2023-12-23

## 2023-12-23 RX ORDER — RILPIVIRINE HYDROCHLORIDE 25 MG/1
1 TABLET, FILM COATED ORAL
Refills: 0 | DISCHARGE

## 2023-12-23 RX ORDER — AMLODIPINE BESYLATE 2.5 MG/1
5 TABLET ORAL DAILY
Refills: 0 | Status: DISCONTINUED | OUTPATIENT
Start: 2023-12-23 | End: 2023-12-25

## 2023-12-23 RX ORDER — DOLUTEGRAVIR SODIUM 25 MG/1
50 TABLET, FILM COATED ORAL DAILY
Refills: 0 | Status: DISCONTINUED | OUTPATIENT
Start: 2023-12-23 | End: 2023-12-25

## 2023-12-23 RX ORDER — ONDANSETRON 8 MG/1
4 TABLET, FILM COATED ORAL EVERY 6 HOURS
Refills: 0 | Status: DISCONTINUED | OUTPATIENT
Start: 2023-12-23 | End: 2023-12-25

## 2023-12-23 RX ADMIN — ONDANSETRON 4 MILLIGRAM(S): 8 TABLET, FILM COATED ORAL at 22:33

## 2023-12-23 RX ADMIN — SODIUM CHLORIDE 600 MILLILITER(S): 5 INJECTION, SOLUTION INTRAVENOUS at 17:57

## 2023-12-23 RX ADMIN — Medication 650 MILLIGRAM(S): at 22:33

## 2023-12-23 RX ADMIN — TAMSULOSIN HYDROCHLORIDE 0.4 MILLIGRAM(S): 0.4 CAPSULE ORAL at 22:33

## 2023-12-23 RX ADMIN — Medication 650 MILLIGRAM(S): at 23:24

## 2023-12-23 NOTE — ED ADULT NURSE NOTE - CHIEF COMPLAINT QUOTE
64 y/o male c/o hesitancy and frequency urinating, increased insomnia and anxiety since anxiety medications were changed.

## 2023-12-23 NOTE — H&P ADULT - PROBLEM SELECTOR PLAN 1
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 Pt is a 63 M with HTN HIV, anxiety, recent hospital admission for hyponatremia possibly 2/2 SIADH 2/2 fluoxetine, swapped to buspirone by PCP, now with urinary frequency and hesitancy, also worsening insomnia and anxiety. /82 on presentation. 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 Patient states that he has been having headaches located in the back of his head, he states that he does not have any visual changes. Patient states that he had one episode of emesis in the ED, but has not had any episodes at home or since coming up, roughly a cup per partner. Patient states that he visited his PCP yesterday for a follow up visit, Na level yesterday 131  plan  - Nephrology consult:   Na 117 now  Urine studies pending, Check UOsm David  Agree with 100 cc bolus of 3% saline  Can repeat 100cc bolus x 2 with a goal to resolve symptoms or increase the Na level 4-6 meq over 1-2 hours if severe symptoms like encephalopathy  Check Na after each bolus and then every 4-6 hours for the first 24 hours  Goal Na 125 by 3 PM 12/24  1L/day fluid restriction

## 2023-12-23 NOTE — PATIENT PROFILE ADULT - FALL HARM RISK - UNIVERSAL INTERVENTIONS
Bed in lowest position, wheels locked, appropriate side rails in place/Call bell, personal items and telephone in reach/Instruct patient to call for assistance before getting out of bed or chair/Non-slip footwear when patient is out of bed/[place] to call system/Physically safe environment - no spills, clutter or unnecessary equipment/Purposeful Proactive Rounding/Room/bathroom lighting operational, light cord in reach Bed in lowest position, wheels locked, appropriate side rails in place/Call bell, personal items and telephone in reach/Instruct patient to call for assistance before getting out of bed or chair/Non-slip footwear when patient is out of bed/[place] to call system/Physically safe environment - no spills, clutter or unnecessary equipment/Purposeful Proactive Rounding/Room/bathroom lighting operational, light cord in reach

## 2023-12-23 NOTE — CONSULT NOTE ADULT - SUBJECTIVE AND OBJECTIVE BOX
*****INCOMPLETE*****     HPI:   63M with HTN HLD HIV anxiety, recent hospital admission for hyponatremia possibly 2/2 SIADH 2/2 fluoxetine, swapped to buspirone by PCP, presents with 1 day of  urinary frequency, patient states that yesterday he was not able to urinate, continues to wake up throughout the night and unable to urinate. Patient states that he has been having headaches located in the back of his head, he states that he does not have any visual changes. Patient states that he had one episode of emesis in the ED, but has not had any episodes at home or since coming up, roughly a cup per partner. Patient states that he visited his PCP yesterday for a follow up visit, Na level yesterday 131 and was doing well. Patient denies chest pain, sob, n/v/d.    Previous admission: 12/18/23: Patient admitted for hyponatremia (Na 124) 2/2 SIADH in the setting of SSRI (fluoxitine), fluid restricted, started on salt tablets, recommended OTC urena supplementations f/u with nephro dc with Na 128. Course c/b by  increased urinary frequency 2/2 SIADH vs. psychogenic polydipsia, Urology consulted, UA negative, creatinine 0.75, no emergent urologic intervention indicated, dc flomax, f/u outpatient urology.       Vitals: Temp 98.5, /82, HR 85, RR 18  Labs: Wbc 6.18, hgb 14.6, Na 119->117, K 4.2, CL 85, Cr 0.72, UA: Ketone trace, LE trace, ph urine 8.5  imaging: CTH: Negative   interventions: 3% Nacl 100 ml bolus IV (23 Dec 2023 18:45)       ADDENDUM:     As stated above. Urology consulted for urinary retention on ED presentation, history provided by patient. Patient states symptoms began in September and have gradually worsened.     He stated he experiences nocturia every hour of the evening. He states the first couple times he gets out of bed he urinates a small amount. After the first two episodes of nocturia he then still has the urge to urinate but little to no urine comes out. He states he also voids during the day every hour to half hour. He voiced his increased urinary habits and anxiety to his PCP who prescribed him Prozac on 12/6. He states after telling PCP symptoms of palpitations and increased urination has no changed since starting Prozac he was advised to present to ED for further workup. He denies any specific foods or beverages affect his urination. States prior to September he would have approximately 2episode of nocturia daily. Denies any new medications besides Prozac recently. He denies dysuria, hematuria. Denies previous surgeries on kidney, ureter or bladder. Denies history of kidney stones. He states only surgery was an inguinal hernia repair 2 years ago. PSA 9/05/2023 was 0.2.         bladder scan: not obtained on admission  CT imaging of bladder: non-obtained on admission      PAST MEDICAL & SURGICAL HISTORY:  Asymptomatic human immunodeficiency virus infection  HIV (human immunodeficiency virus infection)      Asthma  Asthma      HTN (hypertension)      HLD (hyperlipidemia)      Cytomegalovirus infection not present  No significant past surgical history      H/O inguinal hernia repair          MEDICATIONS  (STANDING):  tamsulosin 0.4 milliGRAM(s) Oral at bedtime    MEDICATIONS  (PRN):  acetaminophen     Tablet .. 650 milliGRAM(s) Oral every 6 hours PRN Temp greater or equal to 38C (100.4F), Mild Pain (1 - 3)      Allergies    Seafood (Unknown)  Gluten (Unknown)  Milk (Unknown)  No Known Drug Allergies  Sesame (Unknown)  shellfish (Unknown)    Intolerances        SOCIAL HISTORY:    FAMILY HISTORY:  No pertinent family history  No significant family history        Vital Signs Last 24 Hrs  T(C): 36.7 (23 Dec 2023 19:20), Max: 36.9 (23 Dec 2023 13:53)  T(F): 98.1 (23 Dec 2023 19:20), Max: 98.5 (23 Dec 2023 13:53)  HR: 76 (23 Dec 2023 20:00) (70 - 85)  BP: 142/81 (23 Dec 2023 20:00) (142/81 - 168/82)  BP(mean): 106 (23 Dec 2023 20:00) (106 - 106)  RR: 18 (23 Dec 2023 20:00) (18 - 18)  SpO2: 100% (23 Dec 2023 20:00) (98% - 100%)    Parameters below as of 23 Dec 2023 20:00  Patient On (Oxygen Delivery Method): room air        PHYSICAL EXAM  General: NAD, resting comfortably in bed  C/V: NSR  Pulm: Nonlabored breathing, no respiratory distress on room air  Abd: soft, ND/NT, no rebound tenderness, no guarding, no flank TTP  : vaz draining clear yellow urine.  Extrem: WWP, no edema, SCDs in place      LABS:                        14.6   6.18  )-----------( 230      ( 23 Dec 2023 14:54 )             40.7     12-23    120<LL>  |  86<L>  |  7   ----------------------------<  125<H>  4.2   |  24  |  0.64    Ca    9.5      23 Dec 2023 19:39        Urinalysis Basic - ( 23 Dec 2023 19:39 )    Color: x / Appearance: x / SG: x / pH: x  Gluc: 125 mg/dL / Ketone: x  / Bili: x / Urobili: x   Blood: x / Protein: x / Nitrite: x   Leuk Esterase: x / RBC: x / WBC x   Sq Epi: x / Non Sq Epi: x / Bacteria: x        RADIOLOGY & ADDITIONAL STUDIES: *****INCOMPLETE*****     HPI:   63M with HTN HLD HIV anxiety, recent hospital admission for hyponatremia possibly 2/2 SIADH 2/2 fluoxetine, swapped to buspirone by PCP, presents with 1 day of urinary frequency [id number]. Patient states that yesterday he was not able to urinate, continues to wake up throughout the night and unable to urinate. Patient states that he has been having headaches located in the back of his head, he states that he does not have any visual changes. Patient states that he had one episode of emesis in the ED, but has not had any episodes at home or since coming up, roughly a cup per partner. Patient states that he visited his PCP yesterday for a follow up visit, Na level yesterday 131 and was doing well. Patient denies chest pain, sob, n/v/d.    Previous admission: 12/18/23: Patient admitted for hyponatremia (Na 124) 2/2 SIADH in the setting of SSRI (fluoxitine), fluid restricted, started on salt tablets, recommended OTC urena supplementations f/u with nephro dc with Na 128. Course c/b by increased urinary frequency 2/2 SIADH vs. psychogenic polydipsia, Urology consulted, UA negative, creatinine 0.75, no emergent urologic intervention indicated, dc flomax, f/u outpatient urology.       Vitals: Temp 98.5, /82, HR 85, RR 18  Labs: Wbc 6.18, hgb 14.6, Na 119->117, K 4.2, CL 85, Cr 0.72, UA: Ketone trace, LE trace, ph urine 8.5  imaging: CTH: Negative   interventions: 3% Nacl 100 ml bolus IV (23 Dec 2023 18:45)       ADDENDUM:     As stated above. Urology consulted by medicine team for urinary retention on ED presentation, retention history provided by patient.     On interview, patient gives a history of urinary frequency since September which has gradually progressed in severity through this year. He now has frequency [id number], with low volume voids, and without relief with urination. He also complains of urgency, and a sensation of incomplete emptying. His presentation is complicated by nocturia q1h which is not always productive of urine. He states that he makes urine the first few times he wakes up with decreasing voids afterwards. He denies weak stream, intermittency, hesitancy, dysuria or hematuria.     He voiced his increased urinary habits and anxiety to his PCP who prescribed him Prozac on 12/6. He states after telling PCP symptoms of palpitations and increased urination has no changed since starting Prozac he was advised to present to ED for further workup. He denies any specific foods or beverages affect his urination, although he does drink alcohol and caffeine States prior to September he would have approximately 2episode of nocturia daily. Denies any new medications besides Prozac recently.     He has never had an episode of urinary rentention in the past and during this admission denies having felt any bladder pressure, pain, palpable mass in the lower abdomen or inability to urinate.  He also denies any personal or family history of prostate cancer, kidney cancer, or bladder cancer.  Denies previous surgeries on kidney, ureter or bladder. Denies history of kidney stones. He states only surgery was an inguinal hernia repair 2 years ago. PSA 9/05/2023 was 0.2.     Bladder Scan: not obtained on admission  CT imaging of bladder: non-obtained on admission  UOP since Vaz placement: 350cc, light yellow in color.     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 650 milliGRAM(s) Oral every 6 hours PRN Temp greater or equal to 38C (100.4F), Mild Pain (1 - 3)      Allergies    Seafood (Unknown)  Gluten (Unknown)  Milk (Unknown)  No Known Drug Allergies  Sesame (Unknown)  shellfish (Unknown)    Intolerances        SOCIAL HISTORY:    FAMILY HISTORY:  No pertinent family history  No significant family history        Vital Signs Last 24 Hrs  T(C): 36.7 (23 Dec 2023 19:20), Max: 36.9 (23 Dec 2023 13:53)  T(F): 98.1 (23 Dec 2023 19:20), Max: 98.5 (23 Dec 2023 13:53)  HR: 76 (23 Dec 2023 20:00) (70 - 85)  BP: 142/81 (23 Dec 2023 20:00) (142/81 - 168/82)  BP(mean): 106 (23 Dec 2023 20:00) (106 - 106)  RR: 18 (23 Dec 2023 20:00) (18 - 18)  SpO2: 100% (23 Dec 2023 20:00) (98% - 100%)    Parameters below as of 23 Dec 2023 20:00  Patient On (Oxygen Delivery Method): room air        PHYSICAL EXAM  General: NAD, resting comfortably in bed  C/V: NSR  Pulm: Nonlabored breathing, no respiratory distress on room air  Abd: soft, ND/NT, no rebound tenderness, no guarding, no flank TTP  : vaz draining clear yellow urine. circumcised penis without erythema, lesions or swelling. nonedematous, nontender, testicles. No masses appreciated. No CVA tenderness. No suprapubic tenderness or distention.   Extrem: WWP, no edema, SCDs in place      LABS:                        14.6   6.18  )-----------( 230      ( 23 Dec 2023 14:54 )             40.7     12-23    120<LL>  |  86<L>  |  7   ----------------------------<  125<H>  4.2   |  24  |  0.64    Ca    9.5      23 Dec 2023 19:39        Urinalysis Basic - ( 23 Dec 2023 19:39 )    Color: x / Appearance: x / SG: x / pH: x  Gluc: 125 mg/dL / Ketone: x  / Bili: x / Urobili: x   Blood: x / Protein: x / Nitrite: x   Leuk Esterase: x / RBC: x / WBC x   Sq Epi: x / Non Sq Epi: x / Bacteria: x        RADIOLOGY & ADDITIONAL STUDIES: *****INCOMPLETE*****     HPI:   63M with HTN HLD HIV anxiety, recent hospital admission for hyponatremia possibly 2/2 SIADH 2/2 fluoxetine, swapped to buspirone by PCP, presents with 1 day of urinary frequency [id number]. Patient states that yesterday he was not able to urinate, continues to wake up throughout the night and unable to urinate. Patient states that he has been having headaches located in the back of his head, he states that he does not have any visual changes. Patient states that he had one episode of emesis in the ED, but has not had any episodes at home or since coming up, roughly a cup per partner. Patient states that he visited his PCP yesterday for a follow up visit, Na level yesterday 131 and was doing well. Patient denies chest pain, sob, n/v/d.    Previous admission: 12/18/23: Patient admitted for hyponatremia (Na 124) 2/2 SIADH in the setting of SSRI (fluoxitine), fluid restricted, started on salt tablets, recommended OTC urena supplementations f/u with nephro dc with Na 128. Course c/b by increased urinary frequency 2/2 SIADH vs. psychogenic polydipsia, Urology consulted, UA negative, creatinine 0.75, no emergent urologic intervention indicated, dc flomax, f/u outpatient urology.       Vitals: Temp 98.5, /82, HR 85, RR 18  Labs: Wbc 6.18, hgb 14.6, Na 119->117, K 4.2, CL 85, Cr 0.72, UA: Ketone trace, LE trace, ph urine 8.5  imaging: CTH: Negative   interventions: 3% Nacl 100 ml bolus IV (23 Dec 2023 18:45)       ADDENDUM:     As stated above. Urology consulted by medicine team for urinary retention on ED presentation, retention history provided by patient.     On interview, patient gives a history of urinary frequency since September which has gradually progressed in severity through this year. He now has frequency [id number], with low volume voids, and without relief with urination. He also complains of urgency, and a sensation of incomplete emptying. His presentation is complicated by nocturia q1h which is not always productive of urine. He states that he makes urine the first few times he wakes up with decreasing voids afterwards. He denies weak stream, intermittency, hesitancy, dysuria or hematuria.     He voiced his increased urinary habits and anxiety to his PCP who prescribed him Prozac on 12/6. He states after telling PCP symptoms of palpitations and increased urination has no changed since starting Prozac he was advised to present to ED for further workup. He denies any specific foods or beverages affect his urination, although he does drink alcohol and caffeine States prior to September he would have approximately 2episode of nocturia daily. Denies any new medications besides Prozac recently.     He has never had an episode of urinary rentention in the past and during this admission denies having felt any bladder pressure, pain, palpable mass in the lower abdomen or inability to urinate.  He also denies any personal or family history of prostate cancer, kidney cancer, or bladder cancer.  Denies previous surgeries on kidney, ureter or bladder. Denies history of kidney stones. He states only surgery was an inguinal hernia repair 2 years ago. PSA 9/05/2023 was 0.2.     Bladder Scan: not obtained on admission  CT imaging of bladder: non-obtained on admission  UOP since Vaz placement: 350cc, light yellow in color.     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 650 milliGRAM(s) Oral every 6 hours PRN Temp greater or equal to 38C (100.4F), Mild Pain (1 - 3)      Allergies    Seafood (Unknown)  Gluten (Unknown)  Milk (Unknown)  No Known Drug Allergies  Sesame (Unknown)  shellfish (Unknown)    Intolerances        SOCIAL HISTORY:    FAMILY HISTORY:  No pertinent family history  No significant family history        Vital Signs Last 24 Hrs  T(C): 36.7 (23 Dec 2023 19:20), Max: 36.9 (23 Dec 2023 13:53)  T(F): 98.1 (23 Dec 2023 19:20), Max: 98.5 (23 Dec 2023 13:53)  HR: 76 (23 Dec 2023 20:00) (70 - 85)  BP: 142/81 (23 Dec 2023 20:00) (142/81 - 168/82)  BP(mean): 106 (23 Dec 2023 20:00) (106 - 106)  RR: 18 (23 Dec 2023 20:00) (18 - 18)  SpO2: 100% (23 Dec 2023 20:00) (98% - 100%)    Parameters below as of 23 Dec 2023 20:00  Patient On (Oxygen Delivery Method): room air        PHYSICAL EXAM  General: NAD, resting comfortably in bed  C/V: NSR  Pulm: Nonlabored breathing, no respiratory distress on room air  Abd: soft, ND/NT, no rebound tenderness, no guarding, no flank TTP  : vaz draining clear yellow urine. circumcised penis without erythema, lesions or swelling. nonedematous, nontender, testicles. No masses appreciated. No CVA tenderness. No suprapubic tenderness or distention.   Extrem: WWP, no edema, SCDs in place      LABS:                        14.6   6.18  )-----------( 230      ( 23 Dec 2023 14:54 )             40.7     12-23    120<LL>  |  86<L>  |  7   ----------------------------<  125<H>  4.2   |  24  |  0.64    Ca    9.5      23 Dec 2023 19:39        Urinalysis Basic - ( 23 Dec 2023 19:39 )    Color: x / Appearance: x / SG: x / pH: x  Gluc: 125 mg/dL / Ketone: x  / Bili: x / Urobili: x   Blood: x / Protein: x / Nitrite: x   Leuk Esterase: x / RBC: x / WBC x   Sq Epi: x / Non Sq Epi: x / Bacteria: x        RADIOLOGY & ADDITIONAL STUDIES: HPI:   63M with HTN HLD HIV anxiety, recent hospital admission for hyponatremia possibly 2/2 SIADH 2/2 fluoxetine, swapped to buspirone by PCP, presents with 1 day of urinary frequency [id number]. Patient states that yesterday he was not able to urinate, continues to wake up throughout the night and unable to urinate. Patient states that he has been having headaches located in the back of his head, he states that he does not have any visual changes. Patient states that he had one episode of emesis in the ED, but has not had any episodes at home or since coming up, roughly a cup per partner. Patient states that he visited his PCP yesterday for a follow up visit, Na level yesterday 131 and was doing well. Patient denies chest pain, sob, n/v/d.    Previous admission: 12/18/23: Patient admitted for hyponatremia (Na 124) 2/2 SIADH in the setting of SSRI (fluoxitine), fluid restricted, started on salt tablets, recommended OTC urena supplementations f/u with nephro dc with Na 128. Course c/b by increased urinary frequency 2/2 SIADH vs. psychogenic polydipsia, Urology consulted, UA negative, creatinine 0.75, no emergent urologic intervention indicated, dc flomax, f/u outpatient urology.       Vitals: Temp 98.5, /82, HR 85, RR 18  Labs: Wbc 6.18, hgb 14.6, Na 119->117, K 4.2, CL 85, Cr 0.72, UA: Ketone trace, LE trace, ph urine 8.5  imaging: CTH: Negative   interventions: 3% Nacl 100 ml bolus IV (23 Dec 2023 18:45)       ADDENDUM:     As stated above. Urology consulted by medicine team for urinary retention on ED presentation, retention history provided by patient.     On interview, patient gives a history of urinary frequency since September which has gradually progressed in severity through this year. He now has frequency [id number], with low volume voids, and without relief with urination. He also complains of urgency, and a sensation of incomplete emptying. His presentation is complicated by nocturia q1h which is not always productive of urine. He states that he makes urine the first few times he wakes up with decreasing voids afterwards. He denies weak stream, intermittency, hesitancy, dysuria or hematuria.     He voiced his increased urinary habits and anxiety to his PCP who prescribed him Prozac on 12/6. He states after telling PCP symptoms of palpitations and increased urination has no changed since starting Prozac he was advised to present to ED for further workup. He denies any specific foods or beverages affect his urination, although he does drink alcohol and caffeine States prior to September he would have approximately 2episode of nocturia daily. Denies any new medications besides Prozac recently.     He has never had an episode of urinary rentention in the past and during this admission denies having felt any bladder pressure, pain, palpable mass in the lower abdomen or inability to urinate.  He also denies any personal or family history of prostate cancer, kidney cancer, or bladder cancer.  Denies previous surgeries on kidney, ureter or bladder. Denies history of kidney stones. He states only surgery was an inguinal hernia repair 2 years ago. PSA 9/05/2023 was 0.2.     Bladder Scan: not obtained on admission  CT imaging of bladder: non-obtained on admission  UOP since Vaz placement: 350cc, light yellow in color.     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 650 milliGRAM(s) Oral every 6 hours PRN Temp greater or equal to 38C (100.4F), Mild Pain (1 - 3)      Allergies    Seafood (Unknown)  Gluten (Unknown)  Milk (Unknown)  No Known Drug Allergies  Sesame (Unknown)  shellfish (Unknown)    Intolerances        SOCIAL HISTORY:    FAMILY HISTORY:  No pertinent family history  No significant family history        Vital Signs Last 24 Hrs  T(C): 36.7 (23 Dec 2023 19:20), Max: 36.9 (23 Dec 2023 13:53)  T(F): 98.1 (23 Dec 2023 19:20), Max: 98.5 (23 Dec 2023 13:53)  HR: 76 (23 Dec 2023 20:00) (70 - 85)  BP: 142/81 (23 Dec 2023 20:00) (142/81 - 168/82)  BP(mean): 106 (23 Dec 2023 20:00) (106 - 106)  RR: 18 (23 Dec 2023 20:00) (18 - 18)  SpO2: 100% (23 Dec 2023 20:00) (98% - 100%)    Parameters below as of 23 Dec 2023 20:00  Patient On (Oxygen Delivery Method): room air        PHYSICAL EXAM  General: NAD, resting comfortably in bed  C/V: NSR  Pulm: Nonlabored breathing, no respiratory distress on room air  Abd: soft, ND/NT, no rebound tenderness, no guarding, no flank TTP  : vaz draining clear yellow urine. circumcised penis without erythema, lesions or swelling. nonedematous, nontender, testicles. No masses appreciated. No CVA tenderness. No suprapubic tenderness or distention.   Extrem: WWP, no edema, SCDs in place      LABS:                        14.6   6.18  )-----------( 230      ( 23 Dec 2023 14:54 )             40.7     12-23    120<LL>  |  86<L>  |  7   ----------------------------<  125<H>  4.2   |  24  |  0.64    Ca    9.5      23 Dec 2023 19:39        Urinalysis Basic - ( 23 Dec 2023 19:39 )    Color: x / Appearance: x / SG: x / pH: x  Gluc: 125 mg/dL / Ketone: x  / Bili: x / Urobili: x   Blood: x / Protein: x / Nitrite: x   Leuk Esterase: x / RBC: x / WBC x   Sq Epi: x / Non Sq Epi: x / Bacteria: x        RADIOLOGY & ADDITIONAL STUDIES: HPI:   63M with HTN HLD HIV anxiety, recent hospital admission for hyponatremia possibly 2/2 SIADH 2/2 fluoxetine, swapped to buspirone by PCP, presents with 1 day of urinary frequency [id number]. Patient states that yesterday he was not able to urinate, continues to wake up throughout the night and unable to urinate. Patient states that he has been having headaches located in the back of his head, he states that he does not have any visual changes. Patient states that he had one episode of emesis in the ED, but has not had any episodes at home or since coming up, roughly a cup per partner. Patient states that he visited his PCP yesterday for a follow up visit, Na level yesterday 131 and was doing well. Patient denies chest pain, sob, n/v/d.    Previous admission: 12/18/23: Patient admitted for hyponatremia (Na 124) 2/2 SIADH in the setting of SSRI (fluoxitine), fluid restricted, started on salt tablets, recommended OTC urena supplementations f/u with nephro dc with Na 128. Course c/b by increased urinary frequency 2/2 SIADH vs. psychogenic polydipsia, Urology consulted, UA negative, creatinine 0.75, no emergent urologic intervention indicated, dc flomax, f/u outpatient urology.       Vitals: Temp 98.5, /82, HR 85, RR 18  Labs: Wbc 6.18, hgb 14.6, Na 119->117, K 4.2, CL 85, Cr 0.72, UA: Ketone trace, LE trace, ph urine 8.5  imaging: CTH: Negative   interventions: 3% Nacl 100 ml bolus IV (23 Dec 2023 18:45)       ADDENDUM:     As stated above. Urology consulted by medicine team for urinary retention on ED presentation, retention history provided by patient.     On interview, patient gives a history of urinary frequency since September which has gradually progressed in severity through this year. He now has frequency [id number], with low volume voids, and without relief with urination. He also complains of urgency, and a sensation of incomplete emptying. His presentation is complicated by nocturia q1h which is not always productive of urine. He states that he makes urine the first few times he wakes up with decreasing voids afterwards. He denies weak stream, intermittency, hesitancy, dysuria or hematuria.     He voiced his increased urinary habits and anxiety to his PCP who prescribed him Prozac on 12/6. He states after telling PCP symptoms of palpitations and increased urination has no changed since starting Prozac he was advised to present to ED for further workup. He denies any specific foods or beverages affect his urination, although he does drink alcohol and caffeine States prior to September he would have approximately 2episode of nocturia daily. Denies any new medications besides Prozac recently.     He has never had an episode of urinary rentention in the past and during this admission denies having felt any bladder pressure, pain, palpable mass in the lower abdomen or inability to urinate.  He also denies any personal or family history of prostate cancer, kidney cancer, or bladder cancer.  Denies previous surgeries on kidney, ureter or bladder. Denies history of kidney stones. He states only surgery was an inguinal hernia repair 2 years ago. PSA 9/05/2023 was 0.2.     Bladder Scan: not obtained on admission  CT imaging of bladder: non-obtained on admission  UOP since Vaz placement: 350cc, light yellow in color.     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 650 milliGRAM(s) Oral every 6 hours PRN Temp greater or equal to 38C (100.4F), Mild Pain (1 - 3)      Allergies    Seafood (Unknown)  Gluten (Unknown)  Milk (Unknown)  No Known Drug Allergies  Sesame (Unknown)  shellfish (Unknown)    Intolerances        SOCIAL HISTORY:    FAMILY HISTORY:  No pertinent family history  No significant family history        Vital Signs Last 24 Hrs  T(C): 36.7 (23 Dec 2023 19:20), Max: 36.9 (23 Dec 2023 13:53)  T(F): 98.1 (23 Dec 2023 19:20), Max: 98.5 (23 Dec 2023 13:53)  HR: 76 (23 Dec 2023 20:00) (70 - 85)  BP: 142/81 (23 Dec 2023 20:00) (142/81 - 168/82)  BP(mean): 106 (23 Dec 2023 20:00) (106 - 106)  RR: 18 (23 Dec 2023 20:00) (18 - 18)  SpO2: 100% (23 Dec 2023 20:00) (98% - 100%)    Parameters below as of 23 Dec 2023 20:00  Patient On (Oxygen Delivery Method): room air        PHYSICAL EXAM  General: NAD, resting comfortably in bed  C/V: NSR  Pulm: Nonlabored breathing, no respiratory distress on room air  Abd: soft, ND/NT, no rebound tenderness, no guarding, no flank TTP  : vaz draining clear yellow urine. circumcised penis without erythema, lesions or swelling. nonedematous, nontender, testicles. No masses appreciated. No CVA tenderness. No suprapubic tenderness or distention.   Extrem: WWP, no edema, SCDs in place      LABS:                        14.6   6.18  )-----------( 230      ( 23 Dec 2023 14:54 )             40.7     12-23    120<LL>  |  86<L>  |  7   ----------------------------<  125<H>  4.2   |  24  |  0.64    Ca    9.5      23 Dec 2023 19:39        Urinalysis Basic - ( 23 Dec 2023 19:39 )    Color: x / Appearance: x / SG: x / pH: x  Gluc: 125 mg/dL / Ketone: x  / Bili: x / Urobili: x   Blood: x / Protein: x / Nitrite: x   Leuk Esterase: x / RBC: x / WBC x   Sq Epi: x / Non Sq Epi: x / Bacteria: x        RADIOLOGY & ADDITIONAL STUDIES:

## 2023-12-23 NOTE — H&P ADULT - ATTENDING COMMENTS
HIV, hyponatremia, urinary retention  physical as above  + headache so given hypertonic saline acutely, hope to raise Na to 121-123 and then volume restrict  continue HIV meds  vaz and urology consult  add back flomax  admit to monitored area with severe hyponatremia

## 2023-12-23 NOTE — ED ADULT NURSE NOTE - PRIMARY CARE PROVIDER
Dr. Forbes  [phone]     Dr. Sage Wu   [phone] Dr. Forbes  [phone]     Dr. Sage Wu   [phone]

## 2023-12-23 NOTE — ED ADULT NURSE NOTE - CHIEF COMPLAINT
The patient is a 63y Male complaining of  The patient is a 63y Male complaining of urinary hesitancy and anxiety.

## 2023-12-23 NOTE — H&P ADULT - HISTORY OF PRESENT ILLNESS
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 Course c/b by  increased urinary frequency 2/2 SIADH vs. psychogenic polydipsia, Urology consulted, UA negative, creatinine 0.75, no emergent urologic intervention indicated, dc flomax, f/u outpatient urology.       Vitals: Temp 98.5, /82, HR 85, RR 18  Labs: Wbc 6.18, hgb 14.6, Na 119->117, K 4.2, CL 85, Cr 0.72, UA: Ketone trace, LE trace, ph urine 8.5  imaging: CTH pending  interventions: 3% Nacl 100 ml bolus IV  63M with HTN HLD HIV anxiety, recent hospital admission for hyponatremia possibly 2/2 SIADH 2/2 fluoxetine, swapped to buspirone by PCP, presents with 1 day of  urinary frequency, patient states that yesterday he was not able to urinate, continues to wake up throught the night and unable to urinate. Patient states that he has been having headaches located in the back of his head, he states that he does not have any visual changes. Patient states that he had one episode of emesis in the ED, roughly a cup per partner. Patient states that he visited his PCP yesterday for a follow up visit, Na level yesterday 131.     Previous admission: 12/18/23: Patient admitted for hyponatermia (Na 124) 2/2 SIADH in the setting of SSRI (fluoxitine), fluid restricted, started on salt tablets, recommended OTC urena supplementations f/u with nephro dc with Na 128. Course c/b by  increased urinary frequency 2/2 SIADH vs. psychogenic polydipsia, Urology consulted, UA negative, creatinine 0.75, no emergent urologic intervention indicated, dc flomax, f/u outpatient urology.       Vitals: Temp 98.5, /82, HR 85, RR 18  Labs: Wbc 6.18, hgb 14.6, Na 119->117, K 4.2, CL 85, Cr 0.72, UA: Ketone trace, LE trace, ph urine 8.5  imaging: CT pending  interventions: 3% Nacl 100 ml bolus IV  63M with HTN HLD HIV anxiety, recent hospital admission for hyponatremia possibly 2/2 SIADH 2/2 fluoxetine, swapped to buspirone by PCP, presents with 1 day of  urinary frequency, patient states that yesterday he was not able to urinate, continues to wake up throught the night and unable to urinate. Patient states that he has been having headaches located in the back of his head, he states that he does not have any visual changes. Patient states that he had one episode of emesis in the ED, but has not had any episodes at home or since coming up, roughly a cup per partner. Patient states that he visited his PCP yesterday for a follow up visit, Na level yesterday 131 and was doing well. Patient denies chest pain, sob, n/v/d.    Previous admission: 12/18/23: Patient admitted for hyponatremia (Na 124) 2/2 SIADH in the setting of SSRI (fluoxitine), fluid restricted, started on salt tablets, recommended OTC urena supplementations f/u with nephro dc with Na 128. Course c/b by  increased urinary frequency 2/2 SIADH vs. psychogenic polydipsia, Urology consulted, UA negative, creatinine 0.75, no emergent urologic intervention indicated, dc flomax, f/u outpatient urology.       Vitals: Temp 98.5, /82, HR 85, RR 18  Labs: Wbc 6.18, hgb 14.6, Na 119->117, K 4.2, CL 85, Cr 0.72, UA: Ketone trace, LE trace, ph urine 8.5  imaging: CTH: Negative   interventions: 3% Nacl 100 ml bolus IV

## 2023-12-23 NOTE — H&P ADULT - PROBLEM SELECTOR PLAN 6
Home medication: Anoro Ellipta 62.5-25  plan  - have partner bring medication so patient can resume in hospital.

## 2023-12-23 NOTE — CONSULT NOTE ADULT - ASSESSMENT
*****INCOMPLETE*****     63 M with PMH of HIV (cd4 180s VL undetect), HTN, HLD, and anxiety presents with chest pressure, palpitations, and nocturia (every ~1h) since the end of September with increased frequency of urination (to every 30 min) over the past ~ week. Patient complaints of difficulty sleeping due to waking up to urinate. Urology consulted for increased urinary retention. UA negative, creatinine 0.75. No radiographic evidence of retention in the chart, patient does not give a history of retention and instead describe what is more likely overactive bladder with increased urinary frequency.       Problem/Recommendation - 1   ·  Problem: Increased urinary frequency.   ·  Recommendation: - no emergent urologic intervention indicated at this time   - would measure urine output   - please reconsult if patient is still having increased urinary frequency once SIADH vs. psychogenic polydipsia is worked up and treated   - patient should follow up with urology outpatient if urinary frequency is persistent after above causes are treated  - would recommend stopping flomax as patient is not having difficulty urinating   - rest of care as per primary team     _FINAL RECS TO FOLLOW WHEN DISCUSSED WITH ATTENDING    *****INCOMPLETE*****     63 M with PMH of HIV (cd4 180s VL undetect), HTN, HLD, and anxiety presents with chest pressure, palpitations, and nocturia (every ~1h) since the end of September with increased frequency of urination (to every 30 min) over the past ~ week. Patient complaints of difficulty sleeping due to waking up to urinate. Urology consulted for urinary retention in the ED and per patient. UA negative, creatinine 0.64 (prior 0.75 on 12/18/23). No elevation in Cr, bladder distention or other signs or symptoms of retention. No radiographic evidence of retention in the chart, Patient does not give a history of retention and instead describes what is more likely overactive bladder with bothersome LUTS (urinary frequency, urgency and incomplete emptying).     Problem/Recommendation - 1   ·  Problem: Increased urinary frequency, urgency and sensation of incomplete emptying  ·  Recommendations:  - no emergent urologic intervention indicated at this time   - would continue to measure and monitor urine output and quality during this admission.   - May keep Claudio catheter to obtain strict I/Os in the setting of acute hyponatremia  - Attempt TOV once there is no longer a need for Claudio catheterization for monitoring of I/Os  - Please ensure that patient is ambulating near baseline and off any bladder relaxing medications (Oxybutynin, Mirabegron) for 2 days prior to attempting TOV  - Tadalafil 5mg QD for irritative LUTS  - patient should follow up with urology outpatient if urinary frequency is persistent after above causes are treated     Problem/Recommendation - 2   Problem: urinary rentention: patient is low risk for urinary rentention given no prior episodes, frequent voiding habits and no family history of BPH, retention or prostate cancer.   - Please obtain RBUS to screen for any hydronephrosis     - rest of care as per primary team   - please reconsult if patient is still having increased urinary frequency once SIADH vs. psychogenic polydipsia is worked up and treated     - FINAL RECS TO FOLLOW IN THE AM WHEN DISCUSSED WITH ATTENDING-    63 M with PMH of HIV (cd4 180s VL undetect), HTN, HLD, and anxiety presents with chest pressure, palpitations, and nocturia (every ~1h) since the end of September with increased frequency of urination (to every 30 min) over the past ~ week. Patient complaints of difficulty sleeping due to waking up to urinate. Urology consulted for urinary retention in the ED and per patient. UA negative, creatinine 0.64 (prior 0.75 on 12/18/23). No elevation in Cr, bladder distention or other signs or symptoms of retention. No radiographic evidence of retention in the chart, Patient does not give a history of retention and instead describes what is more likely overactive bladder with bothersome LUTS (urinary frequency, urgency and incomplete emptying).     Problem/Recommendation - 1   ·  Problem: Increased urinary frequency, urgency and sensation of incomplete emptying  ·  Recommendations:  - no emergent urologic intervention indicated at this time   - would continue to measure and monitor urine output and quality during this admission.   - May keep Claudio catheter to obtain strict I/Os in the setting of acute hyponatremia  - Attempt TOV once there is no longer a need for Claudio catheterization for monitoring of I/Os  - Please ensure that patient is ambulating near baseline and off any bladder relaxing medications (Oxybutynin, Mirabegron) for 2 days prior to attempting TOV  - Can attempt treatment with OAB medications as tolerated (Oxybutynin vs Mirabegron)  - Tadalafil 5mg QD for irritative LUTS  - patient should follow up with urology outpatient if urinary frequency is persistent after above causes are treated     Problem/Recommendation - 2   Problem: urinary rentention: patient is low risk for urinary rentention given no prior episodes, frequent voiding habits and no family history of BPH, retention or prostate cancer.   - Please obtain RBUS to screen for any hydronephrosis     - rest of care as per primary team   - please reconsult if patient is still having increased urinary frequency once SIADH vs. psychogenic polydipsia is worked up and treated

## 2023-12-23 NOTE — ED ADULT NURSE NOTE - NSFALLUNIVINTERV_ED_ALL_ED
Bed/Stretcher in lowest position, wheels locked, appropriate side rails in place/Call bell, personal items and telephone in reach/Instruct patient to call for assistance before getting out of bed/chair/stretcher/Non-slip footwear applied when patient is off stretcher/[place] to call system/Physically safe environment - no spills, clutter or unnecessary equipment/Purposeful proactive rounding/Room/bathroom lighting operational, light cord in reach Bed/Stretcher in lowest position, wheels locked, appropriate side rails in place/Call bell, personal items and telephone in reach/Instruct patient to call for assistance before getting out of bed/chair/stretcher/Non-slip footwear applied when patient is off stretcher/[place] to call system/Physically safe environment - no spills, clutter or unnecessary equipment/Purposeful proactive rounding/Room/bathroom lighting operational, light cord in reach

## 2023-12-23 NOTE — ED PROVIDER NOTE - PHYSICAL EXAMINATION
General: comfortable, resting in ED  HEENT: atraumatic, no eye erythema or discharge  Pulm: no cyanosis, no added work of breathing  Cardiac: extremities warm, intact peripheral pulse  GI: no abdominal distension  Neuro: alert, conversant  Psych: neutral affect, cooperative  Msk: no gross deformity or instability  Skin: no erythema or rash

## 2023-12-23 NOTE — H&P ADULT - ASSESSMENT
63 M with HTN HIV, anxiety, recent hospital admission for hyponatremia possibly 2/2 SIADH 2/2 fluoxetine, swapped to buspirone by PCP, presented with reoccurring hyponatremia (Na 119--->117) 2/2 SIADH and urinary frequency, admitted to tele service.

## 2023-12-23 NOTE — H&P ADULT - PROBLEM SELECTOR PLAN 2
etio: Unknown  - Previous admission: Denies hx of DM, thyroid disorder, trauma/injury, A1c 5.5  plan  - Consider Urology consult  - I&Os to determine true UOP  - post void BS  - consider trial of oxybutynin if workup is otherwise negative. etio: Unknown  -  presents with 1 day of  urinary frequency, patient states that yesterday he was not able to urinate, continues to wake up through the night and unable to urinate.   - Previous admission: Denies hx of DM, thyroid disorder, trauma/injury, A1c 5.5  plan  - Consider Urology consult  - I&Os to determine true UOP  - post void BS  - consider trial of oxybutynin if workup is otherwise negative. etio: Unknown  -  presents with 1 day of  urinary frequency, patient states that yesterday he was not able to urinate, continues to wake up through the night and unable to urinate, vaz placed   - Previous admission: Denies hx of DM, thyroid disorder, trauma/injury, A1c 5.5  plan  - Consider Urology consult  - Vaz placed   - [place] I&Os

## 2023-12-23 NOTE — ED ADULT NURSE REASSESSMENT NOTE - NS ED NURSE REASSESS COMMENT FT1
Patient aox 3, anxious, c/o of difficulty voiding, no dysuria, no hematuria, no abdominal pain.  Vital signs stable.  All labs sent.  Results and discharge pending.

## 2023-12-23 NOTE — ED ADULT NURSE NOTE - OBJECTIVE STATEMENT
PMHx  HTN, HLD, HIV, asthma. Patient c/o of 1 week anxiety, states meds had been recently changed for anxiety.  Also c/o of 1 week difficulty urinating w/ hesitancey, back spams and insomnia.  No fever/chills.  PMHx  HTN, HLD, HIV, asthma.

## 2023-12-23 NOTE — ED ADULT NURSE REASSESSMENT NOTE - NS ED NURSE REASSESS COMMENT FT1
ICU at bedside;  hypertonic NaCl 3% IV 100ml infusing over 10 min as ordered, tolerting well.  NSR on cardiac monitor, vital signs stable.  For admit.

## 2023-12-23 NOTE — ED ADULT TRIAGE NOTE - CHIEF COMPLAINT QUOTE
62 y/o male c/o hesitancy and frequency urinating, increased insomnia and anxiety since anxiety medications were changed.

## 2023-12-23 NOTE — H&P ADULT - NSHPADDITIONALINFOADULT_GEN_ALL_CORE
Fluids: restrict 1.5 L  Electrolytes: replete K<4 and Mg<2  Diet: Dash  DVT ppx: Lovenox SQ  Activity: Ambulate as tolerated  Code: Full.

## 2023-12-23 NOTE — ED PROVIDER NOTE - CLINICAL SUMMARY MEDICAL DECISION MAKING FREE TEXT BOX
Given urinary frequency, r/o gross metabolic abnormality ?recurrent hyponatremia ?hyperglycemia ?JUMANA.  Given urinary hesitancy r/o postrenal JUMANA as above.  R/o UTI given urinary symptoms.    Concern for burden of psychiatric illness / anxiety in process of outpatient medication optimization.  No concerning features in history suggestive of acute psychiatric decompensation.

## 2023-12-23 NOTE — H&P ADULT - PROBLEM SELECTOR PLAN 3
Home medications: Edurant 25 mg qd, tivicay 50 mg qd, bactrim 1 DS tab 3 days a week for CD4 <200  plan  - c/w home medications  - no signs of sepsis/infection at this time, hold off on checking CD4.

## 2023-12-23 NOTE — H&P ADULT - NSHPLABSRESULTS_GEN_ALL_CORE
.  LABS:                         14.6   6.18  )-----------( 230      ( 23 Dec 2023 14:54 )             40.7     12    117<LL>  |  x   |  x   ----------------------------<  x   x    |  x   |  x     Ca    9.5      23 Dec 2023 14:54        Urinalysis Basic - ( 23 Dec 2023 15:46 )    Color: Yellow / Appearance: Clear / S.010 / pH: x  Gluc: x / Ketone: Trace mg/dL  / Bili: Negative / Urobili: 0.2 mg/dL   Blood: x / Protein: Negative mg/dL / Nitrite: Negative   Leuk Esterase: Negative / RBC: x / WBC x   Sq Epi: x / Non Sq Epi: x / Bacteria: x                RADIOLOGY, EKG & ADDITIONAL TESTS: Reviewed.

## 2023-12-23 NOTE — ED ADULT NURSE REASSESSMENT NOTE - NS ED NURSE REASSESS COMMENT FT1
Patient aoX 3, anxious, c/o of continued difficulty urinating.  Vital signs stble.  Critical lab result Na+ 119 reported to Dr. White.  Left AC PIV #20 in place, additional labs sent.  For admit.

## 2023-12-23 NOTE — H&P ADULT - NSHPPHYSICALEXAM_GEN_ALL_CORE
CONSTITUTIONAL: Well groomed, no apparent distress  EYES: PERRLA and symmetric, EOMI, No conjunctival or scleral injection, non-icteric  ENMT: Oral mucosa with moist membranes. Normal dentition; no pharyngeal injection or exudates   NECK: Supple, symmetric and without tracheal deviation   RESP: No respiratory distress, no use of accessory muscles; CTA b/l, no WRR  CV: RRR, +S1S2, no MRG; no JVD; no peripheral edema  GI: Soft, NT, ND, no rebound, no guarding; no palpable masses; no hepatosplenomegaly; no hernia palpated  LYMPH: No cervical LAD or tenderness; no axillary LAD or tenderness; no inguinal LAD or tenderness  MSK: Normal gait; No digital clubbing or cyanosis; examination of the (head/neck/spine/ribs/pelvis, RUE, LUE, RLE, LLE) without misalignment,   Normal ROM without pain, no spinal tenderness, normal muscle strength/tone  SKIN: No rashes or ulcers noted; no subcutaneous nodules or induration palpable  NEURO: CN II-XII intact; normal reflexes in upper and lower extremities, sensation intact in upper and lower extremities b/l to light touch   PSYCH: Appropriate insight/judgment; A+O x 3, mood and affect appropriate, recent/remote memory intact

## 2023-12-23 NOTE — CHART NOTE - NSCHARTNOTEFT_GEN_A_CORE
Nephrology consulted for severe hyponatremia Na 117. Pt is a 63 M with HTN HIV, anxiety, recent hospital admission for hyponatremia possibly 2/2 SIADH 2/2 fluoxetine, swapped to buspirone by PCP, now with urinary frequency and hesitancy, also worsening insomnia and anxiety. /82 on presentation. Labs w/ Na 119--->117 without any intervention    #Severe symptomatic hyponatremia  Na 117 now  Urine studies pending, Check UOsm David  Agree with 100 cc bolus of 3% saline  Can repeat 100cc bolus x 3 with a goal to resolve symptoms or increase the Na level 4-6 meq over 1-2 hours if severe symptoms like encephalopathy  Check Na after each bolus and then every 4-6 hours for the first 24 hours  Goal Na 125 by 3 PM 12/24  1L/day fluid restriction      Full consult note to follow in [facility]  PGY-5 Nephrology  [phone] Nephrology consulted for severe hyponatremia Na 117. Pt is a 63 M with HTN HIV, anxiety, recent hospital admission for hyponatremia possibly 2/2 SIADH 2/2 fluoxetine, swapped to buspirone by PCP, now with urinary frequency and hesitancy, also worsening insomnia and anxiety. /82 on presentation. Labs w/ Na 119--->117 without any intervention    #Severe symptomatic hyponatremia  Na 117 now  Urine studies pending, Check UOsm David  Agree with 100 cc bolus of 3% saline  Can repeat 100cc bolus x 3 with a goal to resolve symptoms or increase the Na level 4-6 meq over 1-2 hours if severe symptoms like encephalopathy  Check Na after each bolus and then every 4-6 hours for the first 24 hours  Goal Na 125 by 3 PM 12/24  1L/day fluid restriction      Full consult note to follow in [facility]  PGY-5 Nephrology  [phone] Nephrology consulted for severe hyponatremia Na 117. Pt is a 63 M with HTN HIV, anxiety, recent hospital admission for hyponatremia possibly 2/2 SIADH 2/2 fluoxetine, swapped to buspirone by PCP, now with urinary frequency and hesitancy, also worsening insomnia and anxiety. /82 on presentation. Labs w/ Na 119--->117 without any intervention    #Severe symptomatic hyponatremia  Na 117 now  Urine studies pending, Check UOsm David  Agree with 100 cc bolus of 3% saline  Can repeat 100cc bolus x 2 with a goal to resolve symptoms or increase the Na level 4-6 meq over 1-2 hours if severe symptoms like encephalopathy  Check Na after each bolus and then every 4-6 hours for the first 24 hours  Goal Na 125 by 3 PM 12/24  1L/day fluid restriction      Full consult note to follow in [facility]  PGY-5 Nephrology  [phone] Nephrology consulted for severe hyponatremia Na 117. Pt is a 63 M with HTN HIV, anxiety, recent hospital admission for hyponatremia possibly 2/2 SIADH 2/2 fluoxetine, swapped to buspirone by PCP, now with urinary frequency and hesitancy, also worsening insomnia and anxiety. /82 on presentation. Labs w/ Na 119--->117 without any intervention    #Severe symptomatic hyponatremia  Na 117 now  Urine studies pending, Check UOsm David  Agree with 100 cc bolus of 3% saline  Can repeat 100cc bolus x 2 with a goal to resolve symptoms or increase the Na level 4-6 meq over 1-2 hours if severe symptoms like encephalopathy  Check Na after each bolus and then every 4-6 hours for the first 24 hours  Goal Na 125 by 3 PM 12/24  1L/day fluid restriction      Full consult note to follow in [facility]  PGY-5 Nephrology  [phone]

## 2023-12-23 NOTE — ED PROVIDER NOTE - OBJECTIVE STATEMENT
63M with HTN HLD HIV anxiety, recent hospital admission for hyponatremia possibly 2/2 SIADH 2/2 fluoxetine, swapped to buspirone by PCP, now with urinary frequency and hesitancy, also worsening insomnia and anxiety (denies SI/HI/AVH).

## 2023-12-24 DIAGNOSIS — Z29.9 ENCOUNTER FOR PROPHYLACTIC MEASURES, UNSPECIFIED: ICD-10-CM

## 2023-12-24 LAB
ANION GAP SERPL CALC-SCNC: 10 MMOL/L — SIGNIFICANT CHANGE UP (ref 5–17)
ANION GAP SERPL CALC-SCNC: 10 MMOL/L — SIGNIFICANT CHANGE UP (ref 5–17)
ANION GAP SERPL CALC-SCNC: 8 MMOL/L — SIGNIFICANT CHANGE UP (ref 5–17)
ANION GAP SERPL CALC-SCNC: 8 MMOL/L — SIGNIFICANT CHANGE UP (ref 5–17)
ANION GAP SERPL CALC-SCNC: 9 MMOL/L — SIGNIFICANT CHANGE UP (ref 5–17)
ANION GAP SERPL CALC-SCNC: 9 MMOL/L — SIGNIFICANT CHANGE UP (ref 5–17)
BASOPHILS # BLD AUTO: 0.06 K/UL — SIGNIFICANT CHANGE UP (ref 0–0.2)
BASOPHILS # BLD AUTO: 0.06 K/UL — SIGNIFICANT CHANGE UP (ref 0–0.2)
BASOPHILS NFR BLD AUTO: 1.5 % — SIGNIFICANT CHANGE UP (ref 0–2)
BASOPHILS NFR BLD AUTO: 1.5 % — SIGNIFICANT CHANGE UP (ref 0–2)
BUN SERPL-MCNC: 10 MG/DL — SIGNIFICANT CHANGE UP (ref 7–23)
BUN SERPL-MCNC: 10 MG/DL — SIGNIFICANT CHANGE UP (ref 7–23)
BUN SERPL-MCNC: 14 MG/DL — SIGNIFICANT CHANGE UP (ref 7–23)
BUN SERPL-MCNC: 14 MG/DL — SIGNIFICANT CHANGE UP (ref 7–23)
BUN SERPL-MCNC: 17 MG/DL — SIGNIFICANT CHANGE UP (ref 7–23)
BUN SERPL-MCNC: 17 MG/DL — SIGNIFICANT CHANGE UP (ref 7–23)
CALCIUM SERPL-MCNC: 8.8 MG/DL — SIGNIFICANT CHANGE UP (ref 8.4–10.5)
CALCIUM SERPL-MCNC: 9 MG/DL — SIGNIFICANT CHANGE UP (ref 8.4–10.5)
CALCIUM SERPL-MCNC: 9 MG/DL — SIGNIFICANT CHANGE UP (ref 8.4–10.5)
CHLORIDE SERPL-SCNC: 89 MMOL/L — LOW (ref 96–108)
CHLORIDE SERPL-SCNC: 89 MMOL/L — LOW (ref 96–108)
CHLORIDE SERPL-SCNC: 97 MMOL/L — SIGNIFICANT CHANGE UP (ref 96–108)
CHLORIDE SERPL-SCNC: 97 MMOL/L — SIGNIFICANT CHANGE UP (ref 96–108)
CHLORIDE SERPL-SCNC: 98 MMOL/L — SIGNIFICANT CHANGE UP (ref 96–108)
CHLORIDE SERPL-SCNC: 98 MMOL/L — SIGNIFICANT CHANGE UP (ref 96–108)
CO2 SERPL-SCNC: 22 MMOL/L — SIGNIFICANT CHANGE UP (ref 22–31)
CO2 SERPL-SCNC: 23 MMOL/L — SIGNIFICANT CHANGE UP (ref 22–31)
CO2 SERPL-SCNC: 23 MMOL/L — SIGNIFICANT CHANGE UP (ref 22–31)
CREAT SERPL-MCNC: 0.68 MG/DL — SIGNIFICANT CHANGE UP (ref 0.5–1.3)
CREAT SERPL-MCNC: 0.68 MG/DL — SIGNIFICANT CHANGE UP (ref 0.5–1.3)
CREAT SERPL-MCNC: 0.81 MG/DL — SIGNIFICANT CHANGE UP (ref 0.5–1.3)
CREAT SERPL-MCNC: 0.81 MG/DL — SIGNIFICANT CHANGE UP (ref 0.5–1.3)
CREAT SERPL-MCNC: 0.82 MG/DL — SIGNIFICANT CHANGE UP (ref 0.5–1.3)
CREAT SERPL-MCNC: 0.82 MG/DL — SIGNIFICANT CHANGE UP (ref 0.5–1.3)
EGFR: 104 ML/MIN/1.73M2 — SIGNIFICANT CHANGE UP
EGFR: 104 ML/MIN/1.73M2 — SIGNIFICANT CHANGE UP
EGFR: 99 ML/MIN/1.73M2 — SIGNIFICANT CHANGE UP
EOSINOPHIL # BLD AUTO: 0.13 K/UL — SIGNIFICANT CHANGE UP (ref 0–0.5)
EOSINOPHIL # BLD AUTO: 0.13 K/UL — SIGNIFICANT CHANGE UP (ref 0–0.5)
EOSINOPHIL NFR BLD AUTO: 3.3 % — SIGNIFICANT CHANGE UP (ref 0–6)
EOSINOPHIL NFR BLD AUTO: 3.3 % — SIGNIFICANT CHANGE UP (ref 0–6)
GLUCOSE SERPL-MCNC: 101 MG/DL — HIGH (ref 70–99)
GLUCOSE SERPL-MCNC: 101 MG/DL — HIGH (ref 70–99)
GLUCOSE SERPL-MCNC: 96 MG/DL — SIGNIFICANT CHANGE UP (ref 70–99)
GLUCOSE SERPL-MCNC: 96 MG/DL — SIGNIFICANT CHANGE UP (ref 70–99)
GLUCOSE SERPL-MCNC: 99 MG/DL — SIGNIFICANT CHANGE UP (ref 70–99)
GLUCOSE SERPL-MCNC: 99 MG/DL — SIGNIFICANT CHANGE UP (ref 70–99)
HCT VFR BLD CALC: 38.4 % — LOW (ref 39–50)
HCT VFR BLD CALC: 38.4 % — LOW (ref 39–50)
HGB BLD-MCNC: 13.5 G/DL — SIGNIFICANT CHANGE UP (ref 13–17)
HGB BLD-MCNC: 13.5 G/DL — SIGNIFICANT CHANGE UP (ref 13–17)
IMM GRANULOCYTES NFR BLD AUTO: 0.5 % — SIGNIFICANT CHANGE UP (ref 0–0.9)
IMM GRANULOCYTES NFR BLD AUTO: 0.5 % — SIGNIFICANT CHANGE UP (ref 0–0.9)
LYMPHOCYTES # BLD AUTO: 0.93 K/UL — LOW (ref 1–3.3)
LYMPHOCYTES # BLD AUTO: 0.93 K/UL — LOW (ref 1–3.3)
LYMPHOCYTES # BLD AUTO: 23.3 % — SIGNIFICANT CHANGE UP (ref 13–44)
LYMPHOCYTES # BLD AUTO: 23.3 % — SIGNIFICANT CHANGE UP (ref 13–44)
MAGNESIUM SERPL-MCNC: 1.8 MG/DL — SIGNIFICANT CHANGE UP (ref 1.6–2.6)
MAGNESIUM SERPL-MCNC: 1.8 MG/DL — SIGNIFICANT CHANGE UP (ref 1.6–2.6)
MCHC RBC-ENTMCNC: 27.8 PG — SIGNIFICANT CHANGE UP (ref 27–34)
MCHC RBC-ENTMCNC: 27.8 PG — SIGNIFICANT CHANGE UP (ref 27–34)
MCHC RBC-ENTMCNC: 35.2 GM/DL — SIGNIFICANT CHANGE UP (ref 32–36)
MCHC RBC-ENTMCNC: 35.2 GM/DL — SIGNIFICANT CHANGE UP (ref 32–36)
MCV RBC AUTO: 79.2 FL — LOW (ref 80–100)
MCV RBC AUTO: 79.2 FL — LOW (ref 80–100)
MONOCYTES # BLD AUTO: 0.48 K/UL — SIGNIFICANT CHANGE UP (ref 0–0.9)
MONOCYTES # BLD AUTO: 0.48 K/UL — SIGNIFICANT CHANGE UP (ref 0–0.9)
MONOCYTES NFR BLD AUTO: 12 % — SIGNIFICANT CHANGE UP (ref 2–14)
MONOCYTES NFR BLD AUTO: 12 % — SIGNIFICANT CHANGE UP (ref 2–14)
NEUTROPHILS # BLD AUTO: 2.37 K/UL — SIGNIFICANT CHANGE UP (ref 1.8–7.4)
NEUTROPHILS # BLD AUTO: 2.37 K/UL — SIGNIFICANT CHANGE UP (ref 1.8–7.4)
NEUTROPHILS NFR BLD AUTO: 59.4 % — SIGNIFICANT CHANGE UP (ref 43–77)
NEUTROPHILS NFR BLD AUTO: 59.4 % — SIGNIFICANT CHANGE UP (ref 43–77)
NRBC # BLD: 0 /100 WBCS — SIGNIFICANT CHANGE UP (ref 0–0)
NRBC # BLD: 0 /100 WBCS — SIGNIFICANT CHANGE UP (ref 0–0)
OSMOLALITY SERPL: 253 MOSM/KG — LOW (ref 280–301)
OSMOLALITY SERPL: 253 MOSM/KG — LOW (ref 280–301)
OSMOLALITY SERPL: 271 MOSM/KG — LOW (ref 280–301)
OSMOLALITY SERPL: 271 MOSM/KG — LOW (ref 280–301)
OSMOLALITY SERPL: 272 MOSM/KG — LOW (ref 280–301)
OSMOLALITY SERPL: 272 MOSM/KG — LOW (ref 280–301)
OSMOLALITY UR: 222 MOSM/KG — LOW (ref 300–900)
OSMOLALITY UR: 222 MOSM/KG — LOW (ref 300–900)
OSMOLALITY UR: 347 MOSM/KG — SIGNIFICANT CHANGE UP (ref 300–900)
OSMOLALITY UR: 347 MOSM/KG — SIGNIFICANT CHANGE UP (ref 300–900)
PHOSPHATE SERPL-MCNC: 4.3 MG/DL — SIGNIFICANT CHANGE UP (ref 2.5–4.5)
PHOSPHATE SERPL-MCNC: 4.3 MG/DL — SIGNIFICANT CHANGE UP (ref 2.5–4.5)
PLATELET # BLD AUTO: 219 K/UL — SIGNIFICANT CHANGE UP (ref 150–400)
PLATELET # BLD AUTO: 219 K/UL — SIGNIFICANT CHANGE UP (ref 150–400)
POTASSIUM SERPL-MCNC: 4 MMOL/L — SIGNIFICANT CHANGE UP (ref 3.5–5.3)
POTASSIUM SERPL-MCNC: 4 MMOL/L — SIGNIFICANT CHANGE UP (ref 3.5–5.3)
POTASSIUM SERPL-MCNC: 4.2 MMOL/L — SIGNIFICANT CHANGE UP (ref 3.5–5.3)
POTASSIUM SERPL-MCNC: 4.2 MMOL/L — SIGNIFICANT CHANGE UP (ref 3.5–5.3)
POTASSIUM SERPL-MCNC: 4.5 MMOL/L — SIGNIFICANT CHANGE UP (ref 3.5–5.3)
POTASSIUM SERPL-MCNC: 4.5 MMOL/L — SIGNIFICANT CHANGE UP (ref 3.5–5.3)
POTASSIUM SERPL-SCNC: 4 MMOL/L — SIGNIFICANT CHANGE UP (ref 3.5–5.3)
POTASSIUM SERPL-SCNC: 4 MMOL/L — SIGNIFICANT CHANGE UP (ref 3.5–5.3)
POTASSIUM SERPL-SCNC: 4.2 MMOL/L — SIGNIFICANT CHANGE UP (ref 3.5–5.3)
POTASSIUM SERPL-SCNC: 4.2 MMOL/L — SIGNIFICANT CHANGE UP (ref 3.5–5.3)
POTASSIUM SERPL-SCNC: 4.5 MMOL/L — SIGNIFICANT CHANGE UP (ref 3.5–5.3)
POTASSIUM SERPL-SCNC: 4.5 MMOL/L — SIGNIFICANT CHANGE UP (ref 3.5–5.3)
RBC # BLD: 4.85 M/UL — SIGNIFICANT CHANGE UP (ref 4.2–5.8)
RBC # BLD: 4.85 M/UL — SIGNIFICANT CHANGE UP (ref 4.2–5.8)
RBC # FLD: 12.1 % — SIGNIFICANT CHANGE UP (ref 10.3–14.5)
RBC # FLD: 12.1 % — SIGNIFICANT CHANGE UP (ref 10.3–14.5)
SODIUM SERPL-SCNC: 121 MMOL/L — LOW (ref 135–145)
SODIUM SERPL-SCNC: 121 MMOL/L — LOW (ref 135–145)
SODIUM SERPL-SCNC: 128 MMOL/L — LOW (ref 135–145)
SODIUM SERPL-SCNC: 128 MMOL/L — LOW (ref 135–145)
SODIUM SERPL-SCNC: 129 MMOL/L — LOW (ref 135–145)
SODIUM SERPL-SCNC: 129 MMOL/L — LOW (ref 135–145)
SODIUM UR-SCNC: 36 MMOL/L — SIGNIFICANT CHANGE UP
SODIUM UR-SCNC: 36 MMOL/L — SIGNIFICANT CHANGE UP
WBC # BLD: 3.99 K/UL — SIGNIFICANT CHANGE UP (ref 3.8–10.5)
WBC # BLD: 3.99 K/UL — SIGNIFICANT CHANGE UP (ref 3.8–10.5)
WBC # FLD AUTO: 3.99 K/UL — SIGNIFICANT CHANGE UP (ref 3.8–10.5)
WBC # FLD AUTO: 3.99 K/UL — SIGNIFICANT CHANGE UP (ref 3.8–10.5)

## 2023-12-24 PROCEDURE — 99232 SBSQ HOSP IP/OBS MODERATE 35: CPT | Mod: GC

## 2023-12-24 RX ORDER — SODIUM CHLORIDE 5 G/100ML
500 INJECTION, SOLUTION INTRAVENOUS
Refills: 0 | Status: DISCONTINUED | OUTPATIENT
Start: 2023-12-24 | End: 2023-12-24

## 2023-12-24 RX ORDER — TADALAFIL 10 MG/1
5 TABLET, FILM COATED ORAL EVERY 24 HOURS
Refills: 0 | Status: DISCONTINUED | OUTPATIENT
Start: 2023-12-24 | End: 2023-12-24

## 2023-12-24 RX ORDER — ENOXAPARIN SODIUM 100 MG/ML
40 INJECTION SUBCUTANEOUS EVERY 24 HOURS
Refills: 0 | Status: DISCONTINUED | OUTPATIENT
Start: 2023-12-24 | End: 2023-12-25

## 2023-12-24 RX ADMIN — ATORVASTATIN CALCIUM 20 MILLIGRAM(S): 80 TABLET, FILM COATED ORAL at 22:44

## 2023-12-24 RX ADMIN — SODIUM CHLORIDE 50 MILLILITER(S): 5 INJECTION, SOLUTION INTRAVENOUS at 11:41

## 2023-12-24 RX ADMIN — SODIUM CHLORIDE 30 MILLILITER(S): 5 INJECTION, SOLUTION INTRAVENOUS at 04:11

## 2023-12-24 RX ADMIN — Medication 10 MILLIGRAM(S): at 05:47

## 2023-12-24 RX ADMIN — Medication 650 MILLIGRAM(S): at 11:48

## 2023-12-24 RX ADMIN — Medication 650 MILLIGRAM(S): at 12:48

## 2023-12-24 RX ADMIN — Medication 10 MILLIGRAM(S): at 22:45

## 2023-12-24 RX ADMIN — Medication 650 MILLIGRAM(S): at 06:13

## 2023-12-24 RX ADMIN — RILPIVIRINE HYDROCHLORIDE 25 MILLIGRAM(S): 25 TABLET, FILM COATED ORAL at 17:04

## 2023-12-24 RX ADMIN — ENOXAPARIN SODIUM 40 MILLIGRAM(S): 100 INJECTION SUBCUTANEOUS at 05:50

## 2023-12-24 RX ADMIN — TAMSULOSIN HYDROCHLORIDE 0.4 MILLIGRAM(S): 0.4 CAPSULE ORAL at 22:45

## 2023-12-24 RX ADMIN — DOLUTEGRAVIR SODIUM 50 MILLIGRAM(S): 25 TABLET, FILM COATED ORAL at 11:41

## 2023-12-24 RX ADMIN — Medication 10 MILLIGRAM(S): at 15:08

## 2023-12-24 RX ADMIN — AMLODIPINE BESYLATE 5 MILLIGRAM(S): 2.5 TABLET ORAL at 05:47

## 2023-12-24 RX ADMIN — Medication 650 MILLIGRAM(S): at 07:08

## 2023-12-24 NOTE — CONSULT NOTE ADULT - ASSESSMENT
Nephrology consulted for severe hyponatremia Na 117. Pt is a 63 M with HTN HIV, anxiety, recent hospital admission for hyponatremia possibly 2/2 SIADH 2/2 fluoxetine, swapped to buspirone by PCP, now with urinary frequency and hesitancy, also w/ headache, nausea, vomiting x 1. Labs w/ Na 119--->117 without any intervention. Given 100cc 3% bolus, Na improved to 120. Urine studies returned c/w SIADH. Further 120 ml of 3% given    #Severe symptomatic hyponatremia  Na now at 121  UOsm 347 David 104 12/23  Etiology SIADH of unclear source    12/23: 100 cc 3% bolus  12/24: Additional 120 cc of 3%    Recommend:  Repeat UOsm David  Resume 3% saline at 50ml/hr x 4 hours. Expect this to raise Na by 3 meq. Will hold 3% saline if UOsm <250  BMP with UOsm David Q6hr as pt high risk for ODS  Goal Na 125 by 3 PM today 12/24  1L/day fluid restriction

## 2023-12-24 NOTE — CONSULT NOTE ADULT - SUBJECTIVE AND OBJECTIVE BOX
HPI:   63M with HTN HLD HIV anxiety, recent hospital admission for hyponatremia possibly 2/2 SIADH 2/2 fluoxetine, swapped to buspirone by PCP, presents with 1 day of  urinary frequency, patient states that yesterday he was not able to urinate, continues to wake up throught the night and unable to urinate. Patient states that he has been having headaches located in the back of his head, he states that he does not have any visual changes. Patient states that he had one episode of emesis in the ED, but has not had any episodes at home or since coming up, roughly a cup per partner. Patient states that he visited his PCP yesterday for a follow up visit, Na level yesterday 131 and was doing well. Patient denies chest pain, sob, n/v/d.    Previous admission: 12/18/23: Patient admitted for hyponatremia (Na 124) 2/2 SIADH in the setting of SSRI (fluoxitine), fluid restricted, started on salt tablets, recommended OTC urena supplementations f/u with nephro dc with Na 128. Course c/b by  increased urinary frequency 2/2 SIADH vs. psychogenic polydipsia, Urology consulted, UA negative, creatinine 0.75, no emergent urologic intervention indicated, dc flomax, f/u outpatient urology.       Vitals: Temp 98.5, /82, HR 85, RR 18  Labs: Wbc 6.18, hgb 14.6, Na 119->117, K 4.2, CL 85, Cr 0.72, UA: Ketone trace, LE trace, ph urine 8.5  imaging: CTH: Negative   interventions: 3% Nacl 100 ml bolus IV (23 Dec 2023 18:45)      PAST MEDICAL & SURGICAL HISTORY:  Asymptomatic human immunodeficiency virus infection  HIV (human immunodeficiency virus infection)      Asthma  Asthma      HTN (hypertension)      HLD (hyperlipidemia)      Cytomegalovirus infection not present  No significant past surgical history      H/O inguinal hernia repair            Allergies:  Seafood (Unknown)  Gluten (Unknown)  Milk (Unknown)  No Known Drug Allergies  Sesame (Unknown)  shellfish (Unknown)      Home Medications:   Anoro Ellipta 62.5 mcg-25 mcg/inh inhalation powder: 1 puff(s) inhaled once a day  atorvastatin 20 mg oral tablet: 1 tab(s) orally once a day (at bedtime)  busPIRone 10 mg oral tablet: 1 tab(s) orally once a day  Edurant 25 mg oral tablet: 1 tab(s) orally once a day  Norvasc 5 mg oral tablet: 1 tab(s) orally once a day   Sodium Chloride 1 g oral tablet: 1 tab(s) orally 3 times a day  sulfamethoxazole-trimethoprim 800 mg-160 mg oral tablet: 1 tab(s) orally 3 times a week  Tivicay 50 mg oral tablet: 1 tab(s) orally once a day  Ventolin 90 mcg/inh inhalation aerosol: 2 puff(s) inhaled every 6 hours as needed for  shortness of breath and/or wheezing      Hospital Medications:   MEDICATIONS  (STANDING):  amLODIPine   Tablet 5 milliGRAM(s) Oral daily  atorvastatin 20 milliGRAM(s) Oral at bedtime  busPIRone 10 milliGRAM(s) Oral three times a day  dolutegravir 50 milliGRAM(s) Oral daily  enoxaparin Injectable 40 milliGRAM(s) SubCutaneous every 24 hours  rilpivirine 25 milliGRAM(s) Oral with dinner  sodium chloride 3%. 500 milliLiter(s) (30 mL/Hr) IV Continuous <Continuous>  tamsulosin 0.4 milliGRAM(s) Oral at bedtime  trimethoprim  160 mG/sulfamethoxazole 800 mG 1 Tablet(s) Oral <User Schedule>      SOCIAL HISTORY:  Denies ETOh, Smoking    Family History:  FAMILY HISTORY:  No pertinent family history  No significant family history          VITALS:  T(F): 98 (12-24-23 @ 06:00), Max: 98.5 (12-23-23 @ 13:53)  HR: 76 (12-24-23 @ 04:11)  BP: 118/68 (12-24-23 @ 04:11)  RR: 18 (12-24-23 @ 04:11)  SpO2: 99% (12-24-23 @ 04:11)  Wt(kg): --    12-23 @ 07:01  -  12-24 @ 07:00  --------------------------------------------------------  IN: 0 mL / OUT: 700 mL / NET: -700 mL        Weight (kg): 44.5 (12-23 @ 13:53)  CAPILLARY BLOOD GLUCOSE          Review of Systems:  Negative except as mentioned in HPI    PHYSICAL EXAM:  GENERAL: Alert, awake, oriented x3   CHEST/LUNG: Bilateral clear breath sounds  HEART: Regular rate and rhythm, no murmur, no gallops, no rub   ABDOMEN: Soft, nontender, non distended  : No flank or supra pubic tenderness.  EXTREMITIES: no edema  Neurology: AAOx3, no focal neurological deficit      LABS:  12-24    121<L>  |  89<L>  |  10  ----------------------------<  101<H>  4.0   |  22  |  0.68    Ca    8.8      24 Dec 2023 06:47  Phos  4.3     12-24  Mg     1.8     12-24      Creatinine Trend: 0.68 <--, 0.64 <--, 0.66 <--, 0.72 <--, 0.84 <--, 0.75 <--, 1.10 <--, 0.91 <--, 0.73 <--, 0.76 <--                        13.5   3.99  )-----------( 219      ( 24 Dec 2023 06:47 )             38.4     Urine Studies:  Urinalysis Basic - ( 24 Dec 2023 06:47 )    Color:  / Appearance:  / SG:  / pH:   Gluc: 101 mg/dL / Ketone:   / Bili:  / Urobili:    Blood:  / Protein:  / Nitrite:    Leuk Esterase:  / RBC:  / WBC    Sq Epi:  / Non Sq Epi:  / Bacteria:       Sodium, Random Urine: 104 mmol/L (12-23 @ 21:07)  Osmolality, Random Urine: 347 mosm/kg (12-23 @ 21:07)  Sodium, Random Urine: 70 mmol/L (12-17 @ 10:21)  Osmolality, Random Urine: 481 mosm/kg (12-17 @ 10:21)

## 2023-12-24 NOTE — PROGRESS NOTE ADULT - SUBJECTIVE AND OBJECTIVE BOX
O/N Events: WILFRIDO    Subjective/ROS: Patient seen and examined at bedside. Resting comfortably, complaining of mild headache.    VITALS  Vital Signs Last 24 Hrs  T(C): 36.6 (24 Dec 2023 17:15), Max: 36.7 (23 Dec 2023 18:56)  T(F): 97.8 (24 Dec 2023 17:15), Max: 98.1 (23 Dec 2023 19:20)  HR: 86 (24 Dec 2023 16:05) (62 - 86)  BP: 136/75 (24 Dec 2023 16:05) (118/68 - 166/87)  BP(mean): 100 (24 Dec 2023 16:05) (88 - 106)  RR: 18 (24 Dec 2023 16:05) (18 - 18)  SpO2: 98% (24 Dec 2023 16:05) (97% - 100%)    Parameters below as of 24 Dec 2023 16:05  Patient On (Oxygen Delivery Method): room air    CAPILLARY BLOOD GLUCOSE    PHYSICAL EXAM  General: NAD  Head: pupils reactive  Neck: Supple; no JVD  Respiratory: CTAB  Cardiovascular: Regular rhythm/rate; S1/S2+, no murmurs, rubs gallops   Gastrointestinal: Soft; NTND; bowel sounds normal and present  Extremities: WWP; no edema/cyanosis  Neurological: A&Ox3, CNII-XII grossly intact; no obvious focal deficits    MEDICATIONS  (STANDING):  amLODIPine   Tablet 5 milliGRAM(s) Oral daily  atorvastatin 20 milliGRAM(s) Oral at bedtime  busPIRone 10 milliGRAM(s) Oral three times a day  dolutegravir 50 milliGRAM(s) Oral daily  enoxaparin Injectable 40 milliGRAM(s) SubCutaneous every 24 hours  rilpivirine 25 milliGRAM(s) Oral with dinner  sodium chloride 3%. 500 milliLiter(s) (50 mL/Hr) IV Continuous <Continuous>  Tadalafil 5mg/mL suspension 5 milliGRAM(s) 5 milliGRAM(s) Oral every 24 hours  tamsulosin 0.4 milliGRAM(s) Oral at bedtime  trimethoprim  160 mG/sulfamethoxazole 800 mG 1 Tablet(s) Oral <User Schedule>    MEDICATIONS  (PRN):  acetaminophen     Tablet .. 650 milliGRAM(s) Oral every 6 hours PRN Temp greater or equal to 38C (100.4F), Mild Pain (1 - 3)  albuterol    90 MICROgram(s) HFA Inhaler 2 Puff(s) Inhalation every 6 hours PRN Shortness of Breath and/or Wheezing  ondansetron Injectable 4 milliGRAM(s) IV Push every 6 hours PRN Nausea and/or Vomiting      Seafood (Unknown)  Gluten (Unknown)  Milk (Unknown)  No Known Drug Allergies  Sesame (Unknown)  shellfish (Unknown)      LABS                        13.5   3.99  )-----------( 219      ( 24 Dec 2023 06:47 )             38.4     12-24    128<L>  |  98  |  14  ----------------------------<  99  4.2   |  22  |  0.82    Ca    8.8      24 Dec 2023 16:21  Phos  4.3     12-24  Mg     1.8     12-24        Urinalysis Basic - ( 24 Dec 2023 16:21 )    Color: x / Appearance: x / SG: x / pH: x  Gluc: 99 mg/dL / Ketone: x  / Bili: x / Urobili: x   Blood: x / Protein: x / Nitrite: x   Leuk Esterase: x / RBC: x / WBC x   Sq Epi: x / Non Sq Epi: x / Bacteria: x

## 2023-12-24 NOTE — CONSULT NOTE ADULT - ATTENDING COMMENTS
History reviewed, pt seen and case d/w Dr. Whitmore.  Severe symptomatic hyponatremia (Na 117) presenting with H/A and N/V (x1).   Pt also with c/o increase urinary frequency.  Pt was given a total of 220cc of 3% saline overnight.  Na increased to 121.  Pt seen at bedside cheerful and eating his lunch.  He states he already feels much better.  Agree with further 3% as outlined.  Pt understands to fluid restrict.  Trend labs and regularly communicate with Dr. Whitmore re management.   Renal service will follow closely with you.
Urinary frequency and patient-reported nocturia very likely not due to urinary retention given absence of urinary residual on imaging. Rather, this more likely is excessive urine production and/or bladder overactivity. Such detrusor overactivity can be due to underlying bladder outlet obstruction/BPH or primary OAB, either of which would be exacerbated by high volume urine production (e.g., SIADH, polydipsia, diuretic administration, etc.).    Agree with plan above as described, except would recommend switching from Tadalafil to Tamsulosin 0.4 mg PO daily.

## 2023-12-25 ENCOUNTER — TRANSCRIPTION ENCOUNTER (OUTPATIENT)
Age: 63
End: 2023-12-25

## 2023-12-25 VITALS — TEMPERATURE: 98 F

## 2023-12-25 LAB
ANION GAP SERPL CALC-SCNC: 10 MMOL/L — SIGNIFICANT CHANGE UP (ref 5–17)
ANION GAP SERPL CALC-SCNC: 10 MMOL/L — SIGNIFICANT CHANGE UP (ref 5–17)
ANION GAP SERPL CALC-SCNC: 11 MMOL/L — SIGNIFICANT CHANGE UP (ref 5–17)
ANION GAP SERPL CALC-SCNC: 11 MMOL/L — SIGNIFICANT CHANGE UP (ref 5–17)
ANION GAP SERPL CALC-SCNC: 9 MMOL/L — SIGNIFICANT CHANGE UP (ref 5–17)
ANION GAP SERPL CALC-SCNC: 9 MMOL/L — SIGNIFICANT CHANGE UP (ref 5–17)
BASOPHILS # BLD AUTO: 0.04 K/UL — SIGNIFICANT CHANGE UP (ref 0–0.2)
BASOPHILS # BLD AUTO: 0.04 K/UL — SIGNIFICANT CHANGE UP (ref 0–0.2)
BASOPHILS NFR BLD AUTO: 1.3 % — SIGNIFICANT CHANGE UP (ref 0–2)
BASOPHILS NFR BLD AUTO: 1.3 % — SIGNIFICANT CHANGE UP (ref 0–2)
BUN SERPL-MCNC: 14 MG/DL — SIGNIFICANT CHANGE UP (ref 7–23)
BUN SERPL-MCNC: 14 MG/DL — SIGNIFICANT CHANGE UP (ref 7–23)
BUN SERPL-MCNC: 15 MG/DL — SIGNIFICANT CHANGE UP (ref 7–23)
BUN SERPL-MCNC: 15 MG/DL — SIGNIFICANT CHANGE UP (ref 7–23)
BUN SERPL-MCNC: 19 MG/DL — SIGNIFICANT CHANGE UP (ref 7–23)
BUN SERPL-MCNC: 19 MG/DL — SIGNIFICANT CHANGE UP (ref 7–23)
CALCIUM SERPL-MCNC: 8.8 MG/DL — SIGNIFICANT CHANGE UP (ref 8.4–10.5)
CALCIUM SERPL-MCNC: 9.4 MG/DL — SIGNIFICANT CHANGE UP (ref 8.4–10.5)
CALCIUM SERPL-MCNC: 9.4 MG/DL — SIGNIFICANT CHANGE UP (ref 8.4–10.5)
CHLORIDE SERPL-SCNC: 101 MMOL/L — SIGNIFICANT CHANGE UP (ref 96–108)
CHLORIDE SERPL-SCNC: 101 MMOL/L — SIGNIFICANT CHANGE UP (ref 96–108)
CHLORIDE SERPL-SCNC: 98 MMOL/L — SIGNIFICANT CHANGE UP (ref 96–108)
CO2 SERPL-SCNC: 21 MMOL/L — LOW (ref 22–31)
CO2 SERPL-SCNC: 21 MMOL/L — LOW (ref 22–31)
CO2 SERPL-SCNC: 23 MMOL/L — SIGNIFICANT CHANGE UP (ref 22–31)
CO2 SERPL-SCNC: 23 MMOL/L — SIGNIFICANT CHANGE UP (ref 22–31)
CO2 SERPL-SCNC: 24 MMOL/L — SIGNIFICANT CHANGE UP (ref 22–31)
CO2 SERPL-SCNC: 24 MMOL/L — SIGNIFICANT CHANGE UP (ref 22–31)
CREAT SERPL-MCNC: 0.73 MG/DL — SIGNIFICANT CHANGE UP (ref 0.5–1.3)
CREAT SERPL-MCNC: 0.73 MG/DL — SIGNIFICANT CHANGE UP (ref 0.5–1.3)
CREAT SERPL-MCNC: 0.79 MG/DL — SIGNIFICANT CHANGE UP (ref 0.5–1.3)
CREAT SERPL-MCNC: 0.79 MG/DL — SIGNIFICANT CHANGE UP (ref 0.5–1.3)
CREAT SERPL-MCNC: 0.82 MG/DL — SIGNIFICANT CHANGE UP (ref 0.5–1.3)
CREAT SERPL-MCNC: 0.82 MG/DL — SIGNIFICANT CHANGE UP (ref 0.5–1.3)
EGFR: 100 ML/MIN/1.73M2 — SIGNIFICANT CHANGE UP
EGFR: 100 ML/MIN/1.73M2 — SIGNIFICANT CHANGE UP
EGFR: 102 ML/MIN/1.73M2 — SIGNIFICANT CHANGE UP
EGFR: 102 ML/MIN/1.73M2 — SIGNIFICANT CHANGE UP
EGFR: 99 ML/MIN/1.73M2 — SIGNIFICANT CHANGE UP
EGFR: 99 ML/MIN/1.73M2 — SIGNIFICANT CHANGE UP
EOSINOPHIL # BLD AUTO: 0.1 K/UL — SIGNIFICANT CHANGE UP (ref 0–0.5)
EOSINOPHIL # BLD AUTO: 0.1 K/UL — SIGNIFICANT CHANGE UP (ref 0–0.5)
EOSINOPHIL NFR BLD AUTO: 3.3 % — SIGNIFICANT CHANGE UP (ref 0–6)
EOSINOPHIL NFR BLD AUTO: 3.3 % — SIGNIFICANT CHANGE UP (ref 0–6)
GLUCOSE SERPL-MCNC: 128 MG/DL — HIGH (ref 70–99)
GLUCOSE SERPL-MCNC: 128 MG/DL — HIGH (ref 70–99)
GLUCOSE SERPL-MCNC: 90 MG/DL — SIGNIFICANT CHANGE UP (ref 70–99)
GLUCOSE SERPL-MCNC: 90 MG/DL — SIGNIFICANT CHANGE UP (ref 70–99)
GLUCOSE SERPL-MCNC: 98 MG/DL — SIGNIFICANT CHANGE UP (ref 70–99)
GLUCOSE SERPL-MCNC: 98 MG/DL — SIGNIFICANT CHANGE UP (ref 70–99)
HCT VFR BLD CALC: 37.9 % — LOW (ref 39–50)
HCT VFR BLD CALC: 37.9 % — LOW (ref 39–50)
HGB BLD-MCNC: 13.1 G/DL — SIGNIFICANT CHANGE UP (ref 13–17)
HGB BLD-MCNC: 13.1 G/DL — SIGNIFICANT CHANGE UP (ref 13–17)
IMM GRANULOCYTES NFR BLD AUTO: 0.3 % — SIGNIFICANT CHANGE UP (ref 0–0.9)
IMM GRANULOCYTES NFR BLD AUTO: 0.3 % — SIGNIFICANT CHANGE UP (ref 0–0.9)
LYMPHOCYTES # BLD AUTO: 0.68 K/UL — LOW (ref 1–3.3)
LYMPHOCYTES # BLD AUTO: 0.68 K/UL — LOW (ref 1–3.3)
LYMPHOCYTES # BLD AUTO: 22.7 % — SIGNIFICANT CHANGE UP (ref 13–44)
LYMPHOCYTES # BLD AUTO: 22.7 % — SIGNIFICANT CHANGE UP (ref 13–44)
MAGNESIUM SERPL-MCNC: 1.9 MG/DL — SIGNIFICANT CHANGE UP (ref 1.6–2.6)
MAGNESIUM SERPL-MCNC: 1.9 MG/DL — SIGNIFICANT CHANGE UP (ref 1.6–2.6)
MCHC RBC-ENTMCNC: 28.1 PG — SIGNIFICANT CHANGE UP (ref 27–34)
MCHC RBC-ENTMCNC: 28.1 PG — SIGNIFICANT CHANGE UP (ref 27–34)
MCHC RBC-ENTMCNC: 34.6 GM/DL — SIGNIFICANT CHANGE UP (ref 32–36)
MCHC RBC-ENTMCNC: 34.6 GM/DL — SIGNIFICANT CHANGE UP (ref 32–36)
MCV RBC AUTO: 81.3 FL — SIGNIFICANT CHANGE UP (ref 80–100)
MCV RBC AUTO: 81.3 FL — SIGNIFICANT CHANGE UP (ref 80–100)
MONOCYTES # BLD AUTO: 0.37 K/UL — SIGNIFICANT CHANGE UP (ref 0–0.9)
MONOCYTES # BLD AUTO: 0.37 K/UL — SIGNIFICANT CHANGE UP (ref 0–0.9)
MONOCYTES NFR BLD AUTO: 12.4 % — SIGNIFICANT CHANGE UP (ref 2–14)
MONOCYTES NFR BLD AUTO: 12.4 % — SIGNIFICANT CHANGE UP (ref 2–14)
NEUTROPHILS # BLD AUTO: 1.79 K/UL — LOW (ref 1.8–7.4)
NEUTROPHILS # BLD AUTO: 1.79 K/UL — LOW (ref 1.8–7.4)
NEUTROPHILS NFR BLD AUTO: 60 % — SIGNIFICANT CHANGE UP (ref 43–77)
NEUTROPHILS NFR BLD AUTO: 60 % — SIGNIFICANT CHANGE UP (ref 43–77)
NRBC # BLD: 0 /100 WBCS — SIGNIFICANT CHANGE UP (ref 0–0)
NRBC # BLD: 0 /100 WBCS — SIGNIFICANT CHANGE UP (ref 0–0)
OSMOLALITY SERPL: 271 MOSM/KG — LOW (ref 280–301)
OSMOLALITY SERPL: 271 MOSM/KG — LOW (ref 280–301)
OSMOLALITY UR: 359 MOSM/KG — SIGNIFICANT CHANGE UP (ref 300–900)
OSMOLALITY UR: 359 MOSM/KG — SIGNIFICANT CHANGE UP (ref 300–900)
OSMOLALITY UR: 615 MOSM/KG — SIGNIFICANT CHANGE UP (ref 300–900)
OSMOLALITY UR: 615 MOSM/KG — SIGNIFICANT CHANGE UP (ref 300–900)
PHOSPHATE SERPL-MCNC: 3.5 MG/DL — SIGNIFICANT CHANGE UP (ref 2.5–4.5)
PHOSPHATE SERPL-MCNC: 3.5 MG/DL — SIGNIFICANT CHANGE UP (ref 2.5–4.5)
PLATELET # BLD AUTO: 206 K/UL — SIGNIFICANT CHANGE UP (ref 150–400)
PLATELET # BLD AUTO: 206 K/UL — SIGNIFICANT CHANGE UP (ref 150–400)
POTASSIUM SERPL-MCNC: 3.8 MMOL/L — SIGNIFICANT CHANGE UP (ref 3.5–5.3)
POTASSIUM SERPL-MCNC: 3.8 MMOL/L — SIGNIFICANT CHANGE UP (ref 3.5–5.3)
POTASSIUM SERPL-MCNC: 4 MMOL/L — SIGNIFICANT CHANGE UP (ref 3.5–5.3)
POTASSIUM SERPL-SCNC: 3.8 MMOL/L — SIGNIFICANT CHANGE UP (ref 3.5–5.3)
POTASSIUM SERPL-SCNC: 3.8 MMOL/L — SIGNIFICANT CHANGE UP (ref 3.5–5.3)
POTASSIUM SERPL-SCNC: 4 MMOL/L — SIGNIFICANT CHANGE UP (ref 3.5–5.3)
RBC # BLD: 4.66 M/UL — SIGNIFICANT CHANGE UP (ref 4.2–5.8)
RBC # BLD: 4.66 M/UL — SIGNIFICANT CHANGE UP (ref 4.2–5.8)
RBC # FLD: 12.5 % — SIGNIFICANT CHANGE UP (ref 10.3–14.5)
RBC # FLD: 12.5 % — SIGNIFICANT CHANGE UP (ref 10.3–14.5)
SODIUM SERPL-SCNC: 131 MMOL/L — LOW (ref 135–145)
SODIUM SERPL-SCNC: 133 MMOL/L — LOW (ref 135–145)
SODIUM SERPL-SCNC: 133 MMOL/L — LOW (ref 135–145)
SODIUM UR-SCNC: 66 MMOL/L — SIGNIFICANT CHANGE UP
SODIUM UR-SCNC: 66 MMOL/L — SIGNIFICANT CHANGE UP
SODIUM UR-SCNC: 73 MMOL/L — SIGNIFICANT CHANGE UP
SODIUM UR-SCNC: 73 MMOL/L — SIGNIFICANT CHANGE UP
WBC # BLD: 2.99 K/UL — LOW (ref 3.8–10.5)
WBC # BLD: 2.99 K/UL — LOW (ref 3.8–10.5)
WBC # FLD AUTO: 2.99 K/UL — LOW (ref 3.8–10.5)
WBC # FLD AUTO: 2.99 K/UL — LOW (ref 3.8–10.5)

## 2023-12-25 PROCEDURE — 83935 ASSAY OF URINE OSMOLALITY: CPT

## 2023-12-25 PROCEDURE — 99238 HOSP IP/OBS DSCHRG MGMT 30/<: CPT | Mod: GC

## 2023-12-25 PROCEDURE — 83930 ASSAY OF BLOOD OSMOLALITY: CPT

## 2023-12-25 PROCEDURE — 70450 CT HEAD/BRAIN W/O DYE: CPT | Mod: MA

## 2023-12-25 PROCEDURE — 86359 T CELLS TOTAL COUNT: CPT

## 2023-12-25 PROCEDURE — 84100 ASSAY OF PHOSPHORUS: CPT

## 2023-12-25 PROCEDURE — 80048 BASIC METABOLIC PNL TOTAL CA: CPT

## 2023-12-25 PROCEDURE — 85025 COMPLETE CBC W/AUTO DIFF WBC: CPT

## 2023-12-25 PROCEDURE — 81001 URINALYSIS AUTO W/SCOPE: CPT

## 2023-12-25 PROCEDURE — 84295 ASSAY OF SERUM SODIUM: CPT

## 2023-12-25 PROCEDURE — 86360 T CELL ABSOLUTE COUNT/RATIO: CPT

## 2023-12-25 PROCEDURE — 81003 URINALYSIS AUTO W/O SCOPE: CPT

## 2023-12-25 PROCEDURE — 99285 EMERGENCY DEPT VISIT HI MDM: CPT | Mod: 25

## 2023-12-25 PROCEDURE — 83735 ASSAY OF MAGNESIUM: CPT

## 2023-12-25 PROCEDURE — 36415 COLL VENOUS BLD VENIPUNCTURE: CPT

## 2023-12-25 PROCEDURE — 84300 ASSAY OF URINE SODIUM: CPT

## 2023-12-25 RX ORDER — UREA 15 G
1 POWDER IN PACKET (EA) ORAL
Qty: 60 | Refills: 0
Start: 2023-12-25 | End: 2024-01-23

## 2023-12-25 RX ORDER — TAMSULOSIN HYDROCHLORIDE 0.4 MG/1
1 CAPSULE ORAL
Qty: 30 | Refills: 2
Start: 2023-12-25 | End: 2024-03-23

## 2023-12-25 RX ORDER — UREA 15 G
15 POWDER IN PACKET (EA) ORAL EVERY 12 HOURS
Refills: 0 | Status: DISCONTINUED | OUTPATIENT
Start: 2023-12-25 | End: 2023-12-25

## 2023-12-25 RX ADMIN — AMLODIPINE BESYLATE 5 MILLIGRAM(S): 2.5 TABLET ORAL at 06:46

## 2023-12-25 RX ADMIN — Medication 650 MILLIGRAM(S): at 01:48

## 2023-12-25 RX ADMIN — Medication 10 MILLIGRAM(S): at 14:49

## 2023-12-25 RX ADMIN — Medication 15 GRAM(S): at 17:13

## 2023-12-25 RX ADMIN — RILPIVIRINE HYDROCHLORIDE 25 MILLIGRAM(S): 25 TABLET, FILM COATED ORAL at 16:15

## 2023-12-25 RX ADMIN — Medication 650 MILLIGRAM(S): at 01:04

## 2023-12-25 RX ADMIN — Medication 1 TABLET(S): at 06:47

## 2023-12-25 RX ADMIN — TAMSULOSIN HYDROCHLORIDE 0.4 MILLIGRAM(S): 0.4 CAPSULE ORAL at 17:52

## 2023-12-25 RX ADMIN — Medication 10 MILLIGRAM(S): at 07:08

## 2023-12-25 RX ADMIN — ENOXAPARIN SODIUM 40 MILLIGRAM(S): 100 INJECTION SUBCUTANEOUS at 06:46

## 2023-12-25 RX ADMIN — DOLUTEGRAVIR SODIUM 50 MILLIGRAM(S): 25 TABLET, FILM COATED ORAL at 11:45

## 2023-12-25 NOTE — PROGRESS NOTE ADULT - PROBLEM SELECTOR PLAN 6
Home medication: Anoro Ellipta 62.5-25  plan  - have partner bring medication so patient can resume in hospital.
Home medication: Anoro Ellipta 62.5-25  plan  - have partner bring medication so patient can resume in hospital.

## 2023-12-25 NOTE — PROGRESS NOTE ADULT - PROBLEM SELECTOR PLAN 1
Etiology: Most likely in the setting of SIADH and urinary retention  - previous admission: Nephrology consulted for severe hyponatremia, patient states that yesterday he was not able to urinate, continues to wake up throught the night and unable to urinate. Patient states that he has been having headaches located in the back of his head, he states that he does not have any visual changes. Patient states that he had one episode of emesis in the ED, but has not had any episodes at home or since coming up, roughly a cup per partner. Patient states that he visited his PCP yesterday for a follow up visit, Na level 12/22 131. s/p 100cc bolus of hypertonic saline with improvement in Na. On 12/24, received 3% saline x3 hours with increase 121 --> 128.  - q6 BMP, urine studies  - 1L/day fluid restriction  - renal recs
Etiology: Most likely in the setting of SIADH and urinary retention  - previous admission: Nephrology consulted for severe hyponatremia, patient states that yesterday he was not able to urinate, continues to wake up throught the night and unable to urinate. Patient states that he has been having headaches located in the back of his head, he states that he does not have any visual changes. Patient states that he had one episode of emesis in the ED, but has not had any episodes at home or since coming up, roughly a cup per partner. Patient states that he visited his PCP yesterday for a follow up visit, Na level 12/22 131. s/p 100cc bolus of hypertonic saline with improvement in Na. On 12/24, received 3% saline x3 hours with increase 121 --> 128.  - q6 BMP, urine studies  - 1L/day fluid restriction  - renal recs

## 2023-12-25 NOTE — PROGRESS NOTE ADULT - PROBLEM SELECTOR PLAN 5
Home medication: atorvastatin 20 mg qd  plan  - c/w home medication.
Home medication: atorvastatin 20 mg qd  plan  - c/w home medication.

## 2023-12-25 NOTE — PROGRESS NOTE ADULT - PROBLEM SELECTOR PLAN 8
F: None  E: K>4 Mg>2  N: Regular diet w/ 1000ml fluid restriction  GI: None  DVT: lovenox  Dispo: Tele
F: None  E: K>4 Mg>2  N: Regular diet w/ 1000ml fluid restriction  GI: None  DVT: lovenox  Dispo: Tele

## 2023-12-25 NOTE — DISCHARGE NOTE PROVIDER - HOSPITAL COURSE
#Discharge: do not delete    ALLEN CORTES is a 63y Male with a past medical history of _____    Presented with _____, found to have _____    Problem List/Main Diagnoses (system-based):   #     #     #    Inpatient treatment course:     Patient was discharged to: (home/[facility]/acute rehab/hospice, etc. and w/ home health/home PT/RN/home O2)    New medications:   Changes to old medications:  Medications that were stopped:    Items to follow up as outpatient:    Physical exam at the time of discharge:       LABS & STUDIES:   #Discharge: do not delete    ALLEN CORTES is a 63y Male with a past medical history of HLD, HIV, anxiety presented with 1 day of urinary frequency with headache and an episode of emesis in the ED, found to be hyponatremic to 117. Received 3% hypertonic saline bolus in the ED and was admitted to telemetry. Nephrology consulted for severe hyponatremia. Collected urine studies that were consistent with SIADH. Pt placed on a fluid restriction and received more hypertonic saline. We monitored his sodium with regular BMPs which showed an improvement. Once his sodium improved to 128, the hypertonic saline was stopped. He is now medically ready for discharge and will be sent home on _________.     Problem List/Main Diagnoses (system-based):   # Severe hyponatremia   Pt with hx of hospitalization for hyponatremia presented with increased urinary frequency, headache and emesis, found to have Na 117 likely iso SIADH from unknown source. Was started on a 1L fluid restriction and 100cc 3% saline boluses with regular BMPs. His sodium improved to 128, which is when the 3% boluses were held, after which his sodium babita to 132 -> 131.   - C/w _____    # Overactive bladder   Presented with 1 day of urinary frequency where he found himself waking up many times to urinate. This occurred after having issues with urinary hesitancy. A Claudio was placed on admission and removed 12/24.   - Follow up with PCP    Patient was discharged to: home    New medications:   Changes to old medications: None  Medications that were stopped: None    Items to follow up as outpatient: Hyponatremia, malignancy workup for SIADH    Physical exam at the time of discharge:   General: NAD  HEENT: NC/AT; PERRL, anicteric sclera; MMM  Neck: supple w/o palpable nodularity  Cardiovascular: +S1/S2; RRR  Respiratory: CTA B/L; no W/R/R  Gastrointestinal: soft, NT/ND; +BSx4  Extremities: WWP; no edema, clubbing or cyanosis  Vascular: 2+ radial, DP/PT pulses B/L  Neurological: AAOx3; no focal deficits      LABS & STUDIES:                   13.5   3.99  )-----------( 219      ( 24 Dec 2023 06:47 )             38.4     12-24    129<L>  |  97  |  17  ----------------------------<  96  4.5   |  23  |  0.81    Ca    9.0      24 Dec 2023 22:13  Phos  4.3     12-24  Mg     1.8     12-24     #Discharge: do not delete    ALLEN CORTES is a 63y Male with a past medical history of HLD, HIV, anxiety presented with 1 day of urinary frequency with headache and an episode of emesis in the ED, found to be hyponatremic to 117. Received 3% hypertonic saline bolus in the ED and was admitted to telemetry. Nephrology consulted for severe hyponatremia. Collected urine studies that were consistent with SIADH. Pt placed on a fluid restriction and received more hypertonic saline. We monitored his sodium with regular BMPs which showed an improvement. Once his sodium improved to 128, the hypertonic saline was stopped. He is now medically ready for discharge and will be sent home on _________.     Problem List/Main Diagnoses (system-based):   # Severe hyponatremia   Pt with hx of hospitalization for hyponatremia presented with increased urinary frequency, headache and emesis, found to have Na 117 likely iso SIADH from unknown source. Was started on a 1L fluid restriction and 100cc 3% saline boluses with regular BMPs. His sodium improved to 128, which is when the 3% boluses were held, after which his sodium babita to 132 -> 131.   - Started Urena 15g BID    # Overactive bladder   # Urinary retention  Presented with 1 day of urinary frequency where he found himself waking up many times to urinate. This occurred after having issues with urinary retention. A Claudio was placed on admission and removed 12/24.   - Started Tamsulosin 0.4mg QHS  - Follow up with PCP    Patient was discharged to: home    New medications: Tamsulosin 0.4 QHS, Urena 15g BID  Changes to old medications: None  Medications that were stopped: None    Items to follow up as outpatient: Hyponatremia, malignancy workup for SIADH    Physical exam at the time of discharge:   General: NAD  HEENT: NC/AT; PERRL, anicteric sclera; MMM  Neck: supple w/o palpable nodularity  Cardiovascular: +S1/S2; RRR  Respiratory: CTA B/L; no W/R/R  Gastrointestinal: soft, NT/ND; +BSx4  Extremities: WWP; no edema, clubbing or cyanosis  Vascular: 2+ radial, DP/PT pulses B/L  Neurological: AAOx3; no focal deficits      LABS & STUDIES:                   13.5   3.99  )-----------( 219      ( 24 Dec 2023 06:47 )             38.4     12-24    129<L>  |  97  |  17  ----------------------------<  96  4.5   |  23  |  0.81    Ca    9.0      24 Dec 2023 22:13  Phos  4.3     12-24  Mg     1.8     12-24     #Discharge: do not delete    ALLEN CORTES is a 63y Male with a past medical history of HLD, HIV, anxiety presented with 1 day of urinary frequency with headache and an episode of emesis in the ED, found to be hyponatremic to 117. Received 3% hypertonic saline bolus in the ED and was admitted to telemetry. Nephrology consulted for severe hyponatremia. Collected urine studies that were consistent with SIADH. Pt placed on a fluid restriction and received more hypertonic saline. We monitored his sodium with regular BMPs which showed an improvement. Once his sodium improved to 128, the hypertonic saline was stopped. He is now medically ready for discharge and will be sent home on Urena 15g BID and Tamsulosin 0.4mg QHS.     Problem List/Main Diagnoses (system-based):   # Severe hyponatremia   Pt with hx of hospitalization for hyponatremia presented with increased urinary frequency, headache and emesis, found to have Na 117 likely iso SIADH from unknown source. Was started on a 1L fluid restriction and 100cc 3% saline boluses with regular BMPs. His sodium improved to 128, which is when the 3% boluses were held, after which his sodium babita to 132 -> 131.   - Started Urena 15g BID    # Overactive bladder   # Urinary retention  Presented with 1 day of urinary frequency where he found himself waking up many times to urinate. This occurred after having issues with urinary retention. A Claudio was placed on admission and removed 12/24.   - Started Tamsulosin 0.4mg QHS  - Follow up with PCP    Patient was discharged to: home    New medications: Tamsulosin 0.4 QHS, Urena 15g BID  Changes to old medications: None  Medications that were stopped: None    Items to follow up as outpatient: Hyponatremia, malignancy workup for SIADH    Physical exam at the time of discharge:   General: NAD  HEENT: NC/AT; PERRL, anicteric sclera; MMM  Neck: supple w/o palpable nodularity  Cardiovascular: +S1/S2; RRR  Respiratory: CTA B/L; no W/R/R  Gastrointestinal: soft, NT/ND; +BSx4  Extremities: WWP; no edema, clubbing or cyanosis  Vascular: 2+ radial, DP/PT pulses B/L  Neurological: AAOx3; no focal deficits      LABS & STUDIES:                   13.5   3.99  )-----------( 219      ( 24 Dec 2023 06:47 )             38.4     12-24    129<L>  |  97  |  17  ----------------------------<  96  4.5   |  23  |  0.81    Ca    9.0      24 Dec 2023 22:13  Phos  4.3     12-24  Mg     1.8     12-24

## 2023-12-25 NOTE — DISCHARGE NOTE PROVIDER - NSDCCPCAREPLAN_GEN_ALL_CORE_FT
PRINCIPAL DISCHARGE DIAGNOSIS  Diagnosis: Hyponatremia  Assessment and Plan of Treatment: You were admitted to the hospital due to hyponatremia. Hyponatremia is the medical term for having too little sodium in the blood. When a person has hyponatremia, their body holds on to too much water. This dilutes the amount of sodium in the blood, causing the sodium level to be low. Hyponatremia can be due to several reasons, including certain medical conditions, certain medicines used, drinking too much water, losing a lot of blood, or not eating enough. Symptoms of hyponatremia can vary from nausea/vomiting and headache, to more severe symptoms such as seizures. The investigation of hyponatremia starts with blood and urine tests to a) confirm the low blood sodium, and b) see how much sodium is being lost in the urine. Treatment for hyponatremia can include restricting drinking fluids, providing more concentrated IV fluids, and salt tablets.  We treated your hypernatremia with extra salty water pushes (3% hyeprtonic saline) and a restriction in the amount of fluid you could drink (drinking water dilutes your blood reducing your sodium!). We checked your sodium carefully and watched it slowly improve, which is the best way to correct it. Moving forward it is very important that you see your primary care provider to look into why your sodium continues to drop.   ****Please seek immediate medical attention if you continue to have increased urination with headaches and vomiting****       PRINCIPAL DISCHARGE DIAGNOSIS  Diagnosis: Hyponatremia  Assessment and Plan of Treatment: You were admitted to the hospital due to hyponatremia. Hyponatremia is the medical term for having too little sodium in the blood. When a person has hyponatremia, their body holds on to too much water. This dilutes the amount of sodium in the blood, causing the sodium level to be low. Hyponatremia can be due to several reasons, including certain medical conditions, certain medicines used, drinking too much water, losing a lot of blood, or not eating enough. Symptoms of hyponatremia can vary from nausea/vomiting and headache, to more severe symptoms such as seizures. The investigation of hyponatremia starts with blood and urine tests to a) confirm the low blood sodium, and b) see how much sodium is being lost in the urine. Treatment for hyponatremia can include restricting drinking fluids, providing more concentrated IV fluids, and salt tablets.  We treated your hypernatremia with extra salty water pushes (3% hyeprtonic saline) and a restriction in the amount of fluid you could drink (drinking water dilutes your blood reducing your sodium!). We checked your sodium carefully and watched it slowly improve, which is the best way to correct it. You are being discharged on a medication called Urena. This medication works to increase the amount of water excreted by the kidneys, thereby increasing the sodium concentration in the blood. Please take your Urena 15g powder twice a day every day. You will also be taking a medication called Tamsulosin, which works to help you urinate more easily, preventing retention. Please take your Tamsulosin 0.4mg at bedtime every day. Moving forward it is very important that you see your primary care provider to look into why your sodium continues to drop.   ****Please seek immediate medical attention if you continue to have increased urination with headaches and vomiting****

## 2023-12-25 NOTE — PROGRESS NOTE ADULT - PROBLEM SELECTOR PLAN 4
Home medication: amlodipine 5 mg qd  plan  - c/w home medication.
Home medication: amlodipine 5 mg qd  plan  - c/w home medication.

## 2023-12-25 NOTE — PROGRESS NOTE ADULT - SUBJECTIVE AND OBJECTIVE BOX
Patient is a 63y Male seen and evaluated at bedside.       Meds:    acetaminophen     Tablet .. 650 every 6 hours PRN  albuterol    90 MICROgram(s) HFA Inhaler 2 every 6 hours PRN  amLODIPine   Tablet 5 daily  atorvastatin 20 at bedtime  busPIRone 10 three times a day  dolutegravir 50 daily  enoxaparin Injectable 40 every 24 hours  ondansetron Injectable 4 every 6 hours PRN  rilpivirine 25 with dinner  Tadalafil 5mg/mL suspension 5 milliGRAM(s) 5 every 24 hours  tamsulosin 0.4 at bedtime  trimethoprim  160 mG/sulfamethoxazole 800 mG 1 <User Schedule>      T(C): , Max: 36.6 (12-24-23 @ 17:15)  T(F): , Max: 97.8 (12-24-23 @ 17:15)  HR: 84 (12-25-23 @ 08:12)  BP: 144/72 (12-25-23 @ 08:12)  BP(mean): 101 (12-25-23 @ 08:12)  RR: 18 (12-25-23 @ 08:12)  SpO2: 94% (12-25-23 @ 08:12)  Wt(kg): --    12-24 @ 07:01  -  12-25 @ 07:00  --------------------------------------------------------  IN: 1228 mL / OUT: 3150 mL / NET: -1922 mL          Review of Systems:  ROS negative except as per HPI      PHYSICAL EXAM:  GENERAL: Alert, awake, oriented x3   CHEST/LUNG: Bilateral clear breath sounds  HEART: Regular rate and rhythm, no murmur, no gallops, no rub   ABDOMEN: Soft, nontender, non distended  : No flank or supra pubic tenderness.  EXTREMITIES: no edema  Neurology: AAOx3, no focal neurological deficit      LABS:                        13.1   2.99  )-----------( 206      ( 25 Dec 2023 06:39 )             37.9     12-25    133<L>  |  101  |  14  ----------------------------<  90  3.8   |  21<L>  |  0.73    Ca    8.8      25 Dec 2023 06:39  Phos  3.5     12-25  Mg     1.9     12-25      Osmolality, Serum: 271 mosm/kg *L* [280 - 301] (12-25 @ 06:40)  Osmolality, Serum: 271 mosm/kg *L* [280 - 301] (12-24 @ 22:13)      Urinalysis Basic - ( 25 Dec 2023 06:39 )    Color: x / Appearance: x / SG: x / pH: x  Gluc: 90 mg/dL / Ketone: x  / Bili: x / Urobili: x   Blood: x / Protein: x / Nitrite: x   Leuk Esterase: x / RBC: x / WBC x   Sq Epi: x / Non Sq Epi: x / Bacteria: x      Sodium, Random Urine: 66 mmol/L (12-25 @ 01:13)  Osmolality, Random Urine: 359 mosm/kg (12-25 @ 01:13)  Sodium, Random Urine: 36 mmol/L (12-24 @ 18:55)  Osmolality, Random Urine: 222 mosm/kg (12-24 @ 18:55)  Sodium, Random Urine: 104 mmol/L (12-23 @ 21:07)  Osmolality, Random Urine: 347 mosm/kg (12-23 @ 21:07)        RADIOLOGY & ADDITIONAL STUDIES:

## 2023-12-25 NOTE — PROGRESS NOTE ADULT - PROBLEM SELECTOR PLAN 3
Home medications: Edurant 25 mg qd, tivicay 50 mg qd, bactrim 1 DS tab 3 days a week for CD4 <200  plan  - c/w home medications  - no signs of sepsis/infection at this time, hold off on checking CD4.
Home medications: Edurant 25 mg qd, tivicay 50 mg qd, bactrim 1 DS tab 3 days a week for CD4 <200  plan  - c/w home medications  - no signs of sepsis/infection at this time, hold off on checking CD4.

## 2023-12-25 NOTE — DISCHARGE NOTE PROVIDER - NSDCMRMEDTOKEN_GEN_ALL_CORE_FT
Anoro Ellipta 62.5 mcg-25 mcg/inh inhalation powder: 1 puff(s) inhaled once a day  atorvastatin 20 mg oral tablet: 1 tab(s) orally once a day (at bedtime)  busPIRone 10 mg oral tablet: 1 tab(s) orally once a day  Edurant 25 mg oral tablet: 1 tab(s) orally once a day  Norvasc 5 mg oral tablet: 1 tab(s) orally once a day   Sodium Chloride 1 g oral tablet: 1 tab(s) orally 3 times a day  sulfamethoxazole-trimethoprim 800 mg-160 mg oral tablet: 1 tab(s) orally 3 times a week  Tivicay 50 mg oral tablet: 1 tab(s) orally once a day  Ventolin 90 mcg/inh inhalation aerosol: 2 puff(s) inhaled every 6 hours as needed for  shortness of breath and/or wheezing   Anoro Ellipta 62.5 mcg-25 mcg/inh inhalation powder: 1 puff(s) inhaled once a day  atorvastatin 20 mg oral tablet: 1 tab(s) orally once a day (at bedtime)  busPIRone 10 mg oral tablet: 1 tab(s) orally once a day  Edurant 25 mg oral tablet: 1 tab(s) orally once a day  Norvasc 5 mg oral tablet: 1 tab(s) orally once a day   Sodium Chloride 1 g oral tablet: 1 tab(s) orally 3 times a day  sulfamethoxazole-trimethoprim 800 mg-160 mg oral tablet: 1 tab(s) orally 3 times a week  tamsulosin 0.4 mg oral capsule: 1 cap(s) orally once a day (at bedtime)  Tivicay 50 mg oral tablet: 1 tab(s) orally once a day  ure-Na 15 g oral powder for reconstitution: 1 packet(s) orally every 12 hours  Ventolin 90 mcg/inh inhalation aerosol: 2 puff(s) inhaled every 6 hours as needed for  shortness of breath and/or wheezing

## 2023-12-25 NOTE — PROGRESS NOTE ADULT - PROBLEM SELECTOR PLAN 2
presents with 1 day of  urinary frequency, patient states that yesterday he was not able to urinate, continues to wake up through the night and unable to urinate, vaz placed on admission. Removed 12/24  - Previous admission: Denies hx of DM, thyroid disorder, trauma/injury, A1c 5.5  - q6 BS for TOV  - Stict I&Os  - 1L fluid restriction
presents with 1 day of  urinary frequency, patient states that yesterday he was not able to urinate, continues to wake up through the night and unable to urinate, vaz placed on admission. Removed 12/24  - Previous admission: Denies hx of DM, thyroid disorder, trauma/injury, A1c 5.5  - q6 BS for TOV  - Stict I&Os  - 1L fluid restriction

## 2023-12-25 NOTE — PROGRESS NOTE ADULT - ASSESSMENT
Nephrology consulted for severe hyponatremia Na 117. Pt is a 63 M with HTN, HIV, anxiety, recent hospital admission for hyponatremia possibly 2/2 SIADH 2/2 fluoxetine, swapped to buspirone by PCP, now with urinary frequency and hesitancy, also w/ headache, nausea, vomiting x 1. Labs w/ Na 119--->117 without any intervention. Given 100cc 3% bolus, Na improved to 120. Urine studies returned c/w SIADH. Further 120 ml of 3% given    #Severe symptomatic hyponatremia  Na now at 121  UOsm 347 David 104 12/23  Etiology SIADH possibly due to headache but unclear if headache or hypontremia happened first. Pt appears cachectic, so may require cancer screening to locate possible SIADH source    12/23: 100 cc 3% bolus  12/24: Additional 120 cc of 3%. Additional 150 cc of 3% given but then stopped after rapidly rising Na as UOsm dropped to 347-->222  12/25: Na 133 today. UOsm 359    Recommend:  Na at goal for now but need to maintain at this level. Given UOsm was 359 earlier, will hold off on any d5w or ddavp for now and check a repeat UOsm  Repeat BMP UOsm and David at 10 AM and 2PM  Goal Na 131-133 by 3 PM today 12/25. Depending on above BMPs, may need D5w +/- ddavp  1L/day fluid restriction Nephrology consulted for severe hyponatremia Na 117. Pt is a 63 M with HTN, HIV, anxiety, recent hospital admission for hyponatremia possibly 2/2 SIADH 2/2 fluoxetine, swapped to buspirone by PCP, now with urinary frequency and hesitancy, also w/ headache, nausea, vomiting x 1. Labs w/ Na 119--->117 without any intervention. Given 100cc 3% bolus, Na improved to 120. Urine studies returned c/w SIADH. Further 120 ml of 3% given    #Severe symptomatic hyponatremia  Na at 121 12/24 at 6am  UOsm 347 David 104 12/23  Etiology SIADH possibly due to headache but unclear if headache or hypontremia happened first. Pt appears cachectic, so may require cancer screening to locate possible SIADH source    12/23: 100 cc 3% bolus  12/24: Additional 120 cc of 3%. Additional 150 cc of 3% given but then stopped after rapidly rising Na as UOsm dropped to 347-->222  12/25: Na 133 today. UOsm 359    Recommend:  Na at goal for now but need to maintain at this level. Given UOsm was 359 earlier, will hold off on any d5w or ddavp for now and check a repeat UOsm  Repeat BMP UOsm and David at 10 AM and 2PM  Goal Na 131-133 by 3 PM today 12/25. Depending on above BMPs, may need D5w +/- ddavp  1L/day fluid restriction

## 2023-12-25 NOTE — DISCHARGE NOTE NURSING/CASE MANAGEMENT/SOCIAL WORK - PATIENT PORTAL LINK FT
You can access the FollowMyHealth Patient Portal offered by [facility] by registering at the following website: [email or web address]. By joining [facility]’s FollowMyHealth portal, you will also be able to view your health information using other applications (apps) compatible with our system. You can access the FollowMyHealth Patient Portal offered by [facility] by registering at the following website: [email or web address]. By joining [facility]’s FollowMyHealth portal, you will also be able to view your health information using other applications (apps) compatible with our system.

## 2023-12-25 NOTE — PROGRESS NOTE ADULT - SUBJECTIVE AND OBJECTIVE BOX
OVERNIGHT EVENTS:    SUBJECTIVE / INTERVAL HPI: Patient seen and examined at bedside.     VITAL SIGNS:  Vital Signs Last 24 Hrs  T(C): 36.4 (25 Dec 2023 05:30), Max: 36.6 (24 Dec 2023 17:15)  T(F): 97.6 (25 Dec 2023 05:30), Max: 97.8 (24 Dec 2023 17:15)  HR: 72 (25 Dec 2023 00:53) (72 - 86)  BP: 129/71 (25 Dec 2023 00:53) (129/71 - 141/71)  BP(mean): 94 (25 Dec 2023 00:53) (93 - 100)  RR: 18 (25 Dec 2023 00:53) (17 - 18)  SpO2: 97% (25 Dec 2023 00:53) (96% - 100%)    Parameters below as of 25 Dec 2023 00:53  Patient On (Oxygen Delivery Method): room air        PHYSICAL EXAM:    General: NAD  HEENT: NC/AT; PERRL, anicteric sclera; MMM  Neck: supple w/o palpable nodularity  Cardiovascular: +S1/S2; RRR  Respiratory: CTA B/L; no W/R/R  Gastrointestinal: soft, NT/ND; +BSx4  Extremities: WWP; no edema, clubbing or cyanosis  Vascular: 2+ radial, DP/PT pulses B/L  Neurological: AAOx3; no focal deficits    MEDICATIONS:  MEDICATIONS  (STANDING):  amLODIPine   Tablet 5 milliGRAM(s) Oral daily  atorvastatin 20 milliGRAM(s) Oral at bedtime  busPIRone 10 milliGRAM(s) Oral three times a day  dolutegravir 50 milliGRAM(s) Oral daily  enoxaparin Injectable 40 milliGRAM(s) SubCutaneous every 24 hours  rilpivirine 25 milliGRAM(s) Oral with dinner  Tadalafil 5mg/mL suspension 5 milliGRAM(s) 5 milliGRAM(s) Oral every 24 hours  tamsulosin 0.4 milliGRAM(s) Oral at bedtime  trimethoprim  160 mG/sulfamethoxazole 800 mG 1 Tablet(s) Oral <User Schedule>    MEDICATIONS  (PRN):  acetaminophen     Tablet .. 650 milliGRAM(s) Oral every 6 hours PRN Temp greater or equal to 38C (100.4F), Mild Pain (1 - 3)  albuterol    90 MICROgram(s) HFA Inhaler 2 Puff(s) Inhalation every 6 hours PRN Shortness of Breath and/or Wheezing  ondansetron Injectable 4 milliGRAM(s) IV Push every 6 hours PRN Nausea and/or Vomiting      ALLERGIES:  Allergies    Seafood (Unknown)  Gluten (Unknown)  Milk (Unknown)  No Known Drug Allergies  Sesame (Unknown)  shellfish (Unknown)    Intolerances        LABS:                        13.5   3.99  )-----------( 219      ( 24 Dec 2023 06:47 )             38.4     12-24    129<L>  |  97  |  17  ----------------------------<  96  4.5   |  23  |  0.81    Ca    9.0      24 Dec 2023 22:13  Phos  4.3     12-24  Mg     1.8     12-24        Urinalysis Basic - ( 24 Dec 2023 22:13 )    Color: x / Appearance: x / SG: x / pH: x  Gluc: 96 mg/dL / Ketone: x  / Bili: x / Urobili: x   Blood: x / Protein: x / Nitrite: x   Leuk Esterase: x / RBC: x / WBC x   Sq Epi: x / Non Sq Epi: x / Bacteria: x      CAPILLARY BLOOD GLUCOSE          RADIOLOGY & ADDITIONAL TESTS: Reviewed.   OVERNIGHT EVENTS: 4pm Na 128, David 36, Uosm 222. 3% hypertonic saline held. Repeat 10pm Na 129. 5:30am Na 133.    SUBJECTIVE / INTERVAL HPI: Patient seen and examined at bedside, found laying in bed, awake. He reports feeling well but admits to chest discomfort earlier in the morning that lasted 30min. He believed the chest pain was 2/2 reflux but denies hx of GERD. Chest pain had resolved by the time he was examined. Instructed the pt to alert nurses if chest pain returns.     VITAL SIGNS:  Vital Signs Last 24 Hrs  T(C): 36.4 (25 Dec 2023 05:30), Max: 36.6 (24 Dec 2023 17:15)  T(F): 97.6 (25 Dec 2023 05:30), Max: 97.8 (24 Dec 2023 17:15)  HR: 72 (25 Dec 2023 00:53) (72 - 86)  BP: 129/71 (25 Dec 2023 00:53) (129/71 - 141/71)  BP(mean): 94 (25 Dec 2023 00:53) (93 - 100)  RR: 18 (25 Dec 2023 00:53) (17 - 18)  SpO2: 97% (25 Dec 2023 00:53) (96% - 100%)    Parameters below as of 25 Dec 2023 00:53  Patient On (Oxygen Delivery Method): room air        PHYSICAL EXAM:    General: NAD  HEENT: NC/AT; PERRL, anicteric sclera; MMM  Neck: supple w/o palpable nodularity  Cardiovascular: +S1/S2; RRR  Respiratory: CTA B/L; no W/R/R  Gastrointestinal: soft, NT/ND; +BSx4  Extremities: WWP; no edema, clubbing or cyanosis  Vascular: 2+ radial, DP/PT pulses B/L  Neurological: AAOx3; no focal deficits    MEDICATIONS:  MEDICATIONS  (STANDING):  amLODIPine   Tablet 5 milliGRAM(s) Oral daily  atorvastatin 20 milliGRAM(s) Oral at bedtime  busPIRone 10 milliGRAM(s) Oral three times a day  dolutegravir 50 milliGRAM(s) Oral daily  enoxaparin Injectable 40 milliGRAM(s) SubCutaneous every 24 hours  rilpivirine 25 milliGRAM(s) Oral with dinner  Tadalafil 5mg/mL suspension 5 milliGRAM(s) 5 milliGRAM(s) Oral every 24 hours  tamsulosin 0.4 milliGRAM(s) Oral at bedtime  trimethoprim  160 mG/sulfamethoxazole 800 mG 1 Tablet(s) Oral <User Schedule>    MEDICATIONS  (PRN):  acetaminophen     Tablet .. 650 milliGRAM(s) Oral every 6 hours PRN Temp greater or equal to 38C (100.4F), Mild Pain (1 - 3)  albuterol    90 MICROgram(s) HFA Inhaler 2 Puff(s) Inhalation every 6 hours PRN Shortness of Breath and/or Wheezing  ondansetron Injectable 4 milliGRAM(s) IV Push every 6 hours PRN Nausea and/or Vomiting      ALLERGIES:  Allergies    Seafood (Unknown)  Gluten (Unknown)  Milk (Unknown)  No Known Drug Allergies  Sesame (Unknown)  shellfish (Unknown)    Intolerances        LABS:                        13.5   3.99  )-----------( 219      ( 24 Dec 2023 06:47 )             38.4     12-24    129<L>  |  97  |  17  ----------------------------<  96  4.5   |  23  |  0.81    Ca    9.0      24 Dec 2023 22:13  Phos  4.3     12-24  Mg     1.8     12-24        Urinalysis Basic - ( 24 Dec 2023 22:13 )    Color: x / Appearance: x / SG: x / pH: x  Gluc: 96 mg/dL / Ketone: x  / Bili: x / Urobili: x   Blood: x / Protein: x / Nitrite: x   Leuk Esterase: x / RBC: x / WBC x   Sq Epi: x / Non Sq Epi: x / Bacteria: x      CAPILLARY BLOOD GLUCOSE          RADIOLOGY & ADDITIONAL TESTS: Reviewed.

## 2023-12-25 NOTE — PROGRESS NOTE ADULT - ATTENDING COMMENTS
Hyponatremia.  Case reviewed with Dr. Whitmore  on renal rounds.  Agree with note above.   Na improved and pt feeling well.  Pt aware to fluid restrict.  If d/c'ed, pt must have f/u labs in next 48hrs.
HTN, HIV, SIADH with severe hyponatremia and urinary retention  physical as above  Na improving  continu HIV meds  vaz inp place  voiding trila tomorrow on flomax

## 2023-12-25 NOTE — DISCHARGE NOTE NURSING/CASE MANAGEMENT/SOCIAL WORK - NSDCPEFALRISK_GEN_ALL_CORE
For information on Fall & Injury Prevention, visit: [email or web address] OR  [email or web address] OR  https://www.cdc.gov/steadi/patient.html For information on Fall & Injury Prevention, visit: [email or web address] OR  [email or web address] OR  https://www.cdc.gov/steadi/patient.html

## 2023-12-26 LAB
4/8 RATIO: 0.44 RATIO — LOW (ref 0.9–3.6)
4/8 RATIO: 0.44 RATIO — LOW (ref 0.9–3.6)
ABS CD8: 403 CELLS/UL — SIGNIFICANT CHANGE UP (ref 142–740)
ABS CD8: 403 CELLS/UL — SIGNIFICANT CHANGE UP (ref 142–740)
CD3 BLASTS SPEC-ACNC: 589 CELLS/UL — LOW (ref 672–1870)
CD3 BLASTS SPEC-ACNC: 589 CELLS/UL — LOW (ref 672–1870)
CD3 BLASTS SPEC-ACNC: 65 % — SIGNIFICANT CHANGE UP (ref 59–83)
CD3 BLASTS SPEC-ACNC: 65 % — SIGNIFICANT CHANGE UP (ref 59–83)
CD4 %: 20 % — LOW (ref 30–62)
CD4 %: 20 % — LOW (ref 30–62)
CD8 %: 45 % — HIGH (ref 12–36)
CD8 %: 45 % — HIGH (ref 12–36)
T-CELL CD4 SUBSET PNL BLD: 177 CELLS/UL — LOW (ref 489–1457)
T-CELL CD4 SUBSET PNL BLD: 177 CELLS/UL — LOW (ref 489–1457)
VIABLE CELLS NFR SPEC: 100 % — SIGNIFICANT CHANGE UP
VIABLE CELLS NFR SPEC: 100 % — SIGNIFICANT CHANGE UP

## 2023-12-29 DIAGNOSIS — Z79.899 OTHER LONG TERM (CURRENT) DRUG THERAPY: ICD-10-CM

## 2023-12-29 DIAGNOSIS — N32.81 OVERACTIVE BLADDER: ICD-10-CM

## 2023-12-29 DIAGNOSIS — B20 HUMAN IMMUNODEFICIENCY VIRUS [HIV] DISEASE: ICD-10-CM

## 2023-12-29 DIAGNOSIS — Z91.013 ALLERGY TO SEAFOOD: ICD-10-CM

## 2023-12-29 DIAGNOSIS — Z91.018 ALLERGY TO OTHER FOODS: ICD-10-CM

## 2023-12-29 DIAGNOSIS — F41.9 ANXIETY DISORDER, UNSPECIFIED: ICD-10-CM

## 2023-12-29 DIAGNOSIS — E78.5 HYPERLIPIDEMIA, UNSPECIFIED: ICD-10-CM

## 2023-12-29 DIAGNOSIS — I10 ESSENTIAL (PRIMARY) HYPERTENSION: ICD-10-CM

## 2023-12-29 DIAGNOSIS — E87.1 HYPO-OSMOLALITY AND HYPONATREMIA: ICD-10-CM

## 2023-12-29 DIAGNOSIS — E22.2 SYNDROME OF INAPPROPRIATE SECRETION OF ANTIDIURETIC HORMONE: ICD-10-CM

## 2023-12-29 DIAGNOSIS — Z91.011 ALLERGY TO MILK PRODUCTS: ICD-10-CM

## 2023-12-29 DIAGNOSIS — R33.9 RETENTION OF URINE, UNSPECIFIED: ICD-10-CM

## 2024-01-11 ENCOUNTER — APPOINTMENT (OUTPATIENT)
Dept: NEPHROLOGY | Facility: CLINIC | Age: 64
End: 2024-01-11
Payer: COMMERCIAL

## 2024-01-11 VITALS
HEIGHT: 65 IN | DIASTOLIC BLOOD PRESSURE: 72 MMHG | WEIGHT: 147 LBS | SYSTOLIC BLOOD PRESSURE: 128 MMHG | BODY MASS INDEX: 24.49 KG/M2

## 2024-01-11 PROCEDURE — 99214 OFFICE O/P EST MOD 30 MIN: CPT

## 2024-01-11 PROCEDURE — G2211 COMPLEX E/M VISIT ADD ON: CPT

## 2024-01-11 RX ORDER — UREA 15 G
15 POWDER IN PACKET (EA) ORAL
Refills: 0 | Status: ACTIVE | COMMUNITY

## 2024-01-11 RX ORDER — AMLODIPINE BESYLATE 5 MG/1
5 TABLET ORAL
Refills: 0 | Status: ACTIVE | COMMUNITY

## 2024-01-11 NOTE — CONSULT LETTER
[Dear  ___] : Dear  [unfilled], [Consult Letter:] : I had the pleasure of evaluating your patient, [unfilled]. [Please see my note below.] : Please see my note below. [Consult Closing:] : Thank you very much for allowing me to participate in the care of this patient.  If you have any questions, please do not hesitate to contact me. [Sincerely,] : Sincerely, [FreeTextEntry2] : Dr Pierce [FreeTextEntry3] : Sincerely,   Earl Rhoades MD, FACP

## 2024-01-11 NOTE — ASSESSMENT
[FreeTextEntry1] : 63-year-old man who presented with severe hyponatremia last month after having had a normal serum sodium of 141 in September.  I think his hyponatremia was the result of a perfect storm : Dietary sodium restriction, excessive fluid intake, and fluoxetine.  The latter was stopped, and fluid restriction of 1500 cc instituted.  He was also started on Urena 15 g twice daily which she continues to take.  His latest serum sodium was 134 on January 2.  I have asked him to repeat labs in 6 weeks including electrolytes, BUN, TSH, uric acid, serum osmolality.  He will follow-up with Dr. Pierce and return here in 4 months.

## 2024-01-11 NOTE — PHYSICAL EXAM
[General Appearance - Alert] : alert [General Appearance - In No Acute Distress] : in no acute distress [Sclera] : the sclera and conjunctiva were normal [PERRL With Normal Accommodation] : pupils were equal in size, round, and reactive to light [Extraocular Movements] : extraocular movements were intact [Outer Ear] : the ears and nose were normal in appearance [Oropharynx] : the oropharynx was normal [Neck Appearance] : the appearance of the neck was normal [Neck Cervical Mass (___cm)] : no neck mass was observed [Jugular Venous Distention Increased] : there was no jugular-venous distention [Thyroid Diffuse Enlargement] : the thyroid was not enlarged [Thyroid Nodule] : there were no palpable thyroid nodules [Auscultation Breath Sounds / Voice Sounds] : lungs were clear to auscultation bilaterally [Heart Rate And Rhythm] : heart rate was normal and rhythm regular [Heart Sounds] : normal S1 and S2 [Heart Sounds Gallop] : no gallops [Murmurs] : no murmurs [Heart Sounds Pericardial Friction Rub] : no pericardial rub [Skin Color & Pigmentation] : normal skin color and pigmentation [Skin Turgor] : normal skin turgor [] : no rash [Deep Tendon Reflexes (DTR)] : deep tendon reflexes were 2+ and symmetric [Sensation] : the sensory exam was normal to light touch and pinprick [No Focal Deficits] : no focal deficits [Oriented To Time, Place, And Person] : oriented to person, place, and time [Impaired Insight] : insight and judgment were intact [Affect] : the affect was normal

## 2024-01-11 NOTE — HISTORY OF PRESENT ILLNESS
[FreeTextEntry1] : 63-year-old man recently hospitalized here with severe hyponatremia : Serum sodium was as low as 117 2/1/2019 on admission and gradually babita up to 121, 125, 128, and finally 131 at discharge.  His latest value is 134 as of 8 days ago.  His kidney function is normal as is his potassium.  Urine osmolality and urine sodium were highly variable and is not clear what the etiology was.  A CT of the head was negative.  He has been on fluoxetine in the past.  He has a history of hypertension, which has been well-controlled on amlodipine 5 mg daily.  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  He was put on a 1500 cc fluid restriction when he left the hospital, as well as Urena 15 g twice daily.  I suspect that dose will be able to be reduced over time.

## 2024-03-12 ENCOUNTER — TRANSCRIPTION ENCOUNTER (OUTPATIENT)
Age: 64
End: 2024-03-12

## 2024-03-20 ENCOUNTER — TRANSCRIPTION ENCOUNTER (OUTPATIENT)
Age: 64
End: 2024-03-20

## 2024-03-20 DIAGNOSIS — N40.1 BENIGN PROSTATIC HYPERPLASIA WITH LOWER URINARY TRACT SYMPMS: ICD-10-CM

## 2024-03-20 RX ORDER — TAMSULOSIN HYDROCHLORIDE 0.4 MG/1
0.4 CAPSULE ORAL
Qty: 90 | Refills: 1 | Status: ACTIVE | COMMUNITY
Start: 2024-03-20 | End: 1900-01-01

## 2024-04-20 ENCOUNTER — TRANSCRIPTION ENCOUNTER (OUTPATIENT)
Age: 64
End: 2024-04-20

## 2024-05-09 ENCOUNTER — APPOINTMENT (OUTPATIENT)
Dept: NEPHROLOGY | Facility: CLINIC | Age: 64
End: 2024-05-09
Payer: COMMERCIAL

## 2024-05-09 VITALS
WEIGHT: 147 LBS | DIASTOLIC BLOOD PRESSURE: 77 MMHG | BODY MASS INDEX: 24.49 KG/M2 | SYSTOLIC BLOOD PRESSURE: 128 MMHG | HEIGHT: 65 IN

## 2024-05-09 DIAGNOSIS — E87.1 HYPO-OSMOLALITY AND HYPONATREMIA: ICD-10-CM

## 2024-05-09 DIAGNOSIS — I10 ESSENTIAL (PRIMARY) HYPERTENSION: ICD-10-CM

## 2024-05-09 PROCEDURE — 99214 OFFICE O/P EST MOD 30 MIN: CPT

## 2024-05-09 PROCEDURE — G2211 COMPLEX E/M VISIT ADD ON: CPT

## 2024-05-09 NOTE — ASSESSMENT
[FreeTextEntry1] : 64-year-old man with well-controlled hypertension on amlodipine 5 mg daily, and normal serum sodium -his fluid intake is considerably less then when he became hyponatremic in the past, and he is of course not on fluoxetine anymore.  His sodium intake is also larger than when he was hyponatremic.  I suggested we hold Urena for now and recheck serum sodium and serum osmolality in 6 weeks.  He will be seeing a new PCP on June 20 so I have given him a requisition for those labs.  He will return in 5 to 6 months.

## 2024-05-09 NOTE — HISTORY OF PRESENT ILLNESS
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  He was hospitalized here with severe hyponatremia several months ago, with a serum sodium as low as 117 -it babita to 131 at discharge.  It was probably the result of the perfect storm : Excessive fluid intake, fluoxetine, and sodium restriction.  I put him on Urena 15 g twice daily, then reduced the dose to daily, and more recently, every other day.      His BP has been normal at home on amlodipine 5 mg daily.  It was 151/88 when he saw Dr. Pierce in February.  He has BPH which was treated with tamsulosin.

## 2024-11-11 ENCOUNTER — APPOINTMENT (OUTPATIENT)
Dept: NEPHROLOGY | Facility: CLINIC | Age: 64
End: 2024-11-11